# Patient Record
Sex: FEMALE | Race: BLACK OR AFRICAN AMERICAN | NOT HISPANIC OR LATINO | Employment: FULL TIME | URBAN - METROPOLITAN AREA
[De-identification: names, ages, dates, MRNs, and addresses within clinical notes are randomized per-mention and may not be internally consistent; named-entity substitution may affect disease eponyms.]

---

## 2017-04-10 ENCOUNTER — OFFICE VISIT (OUTPATIENT)
Dept: OBSTETRICS AND GYNECOLOGY | Facility: CLINIC | Age: 47
End: 2017-04-10
Attending: OBSTETRICS & GYNECOLOGY
Payer: COMMERCIAL

## 2017-04-10 VITALS
BODY MASS INDEX: 39.25 KG/M2 | HEIGHT: 69 IN | SYSTOLIC BLOOD PRESSURE: 150 MMHG | DIASTOLIC BLOOD PRESSURE: 84 MMHG | WEIGHT: 265 LBS

## 2017-04-10 DIAGNOSIS — N93.9 ABNORMAL UTERINE BLEEDING: ICD-10-CM

## 2017-04-10 DIAGNOSIS — Z01.419 VISIT FOR GYNECOLOGIC EXAMINATION: Primary | ICD-10-CM

## 2017-04-10 DIAGNOSIS — Z12.31 ENCOUNTER FOR SCREENING MAMMOGRAM FOR BREAST CANCER: ICD-10-CM

## 2017-04-10 LAB
B-HCG UR QL: NEGATIVE
CTP QC/QA: YES

## 2017-04-10 PROCEDURE — 99386 PREV VISIT NEW AGE 40-64: CPT | Mod: 25,S$GLB,, | Performed by: OBSTETRICS & GYNECOLOGY

## 2017-04-10 PROCEDURE — 88305 TISSUE EXAM BY PATHOLOGIST: CPT | Mod: 26,,, | Performed by: PATHOLOGY

## 2017-04-10 PROCEDURE — 81025 URINE PREGNANCY TEST: CPT | Mod: QW,S$GLB,, | Performed by: OBSTETRICS & GYNECOLOGY

## 2017-04-10 PROCEDURE — 88305 TISSUE EXAM BY PATHOLOGIST: CPT | Performed by: PATHOLOGY

## 2017-04-10 PROCEDURE — 88175 CYTOPATH C/V AUTO FLUID REDO: CPT

## 2017-04-10 PROCEDURE — 99999 PR PBB SHADOW E&M-NEW PATIENT-LVL III: CPT | Mod: PBBFAC,,, | Performed by: OBSTETRICS & GYNECOLOGY

## 2017-04-10 PROCEDURE — 58100 BIOPSY OF UTERUS LINING: CPT | Mod: S$GLB,,, | Performed by: OBSTETRICS & GYNECOLOGY

## 2017-04-10 RX ORDER — GLATIRAMER ACETATE 40 MG/ML
1 INJECTION, SOLUTION SUBCUTANEOUS
COMMUNITY
End: 2024-02-27

## 2017-04-10 RX ORDER — FERROUS SULFATE 325(65) MG
325 TABLET ORAL
COMMUNITY
End: 2019-07-25

## 2017-04-10 RX ORDER — ROPINIROLE HYDROCHLORIDE 2 MG/1
2 TABLET, FILM COATED, EXTENDED RELEASE ORAL NIGHTLY
Refills: 0 | COMMUNITY
Start: 2017-03-03 | End: 2019-07-25

## 2017-04-10 RX ORDER — LOSARTAN POTASSIUM 25 MG/1
25 TABLET ORAL DAILY
COMMUNITY
End: 2017-06-30 | Stop reason: CLARIF

## 2017-04-10 RX ORDER — GABAPENTIN 300 MG/1
CAPSULE ORAL
Refills: 3 | COMMUNITY
Start: 2017-03-06 | End: 2020-10-20

## 2017-04-10 NOTE — MR AVS SNAPSHOT
[facility] [address]  [address]  [facility] [address]  Phone: [phone]  Fax: [phone]                  Eleazar Dickson   4/10/2017 1:00 PM   Office Visit    Description:  Female : 1970   Provider:  Amber Cowart MD   Department:  [facility] [address]           Reason for Visit     Well Woman     Menorrhagia           Diagnoses this Visit        Comments    Visit for gynecologic examination    -  Primary     Abnormal uterine bleeding         Encounter for screening mammogram for breast cancer                To Do List           Goals (5 Years of Data)     None      Follow-Up and Disposition     Return in about 1 year (around 4/10/2018).      [facility] On Call     [facility] On Call Nurse Care Line -  Assistance  Unless otherwise directed by your provider, please contact [facility] On-Call, our nurse care line that is available for  assistance.     Registered nurses in the [facility] On Call Center provide: appointment scheduling, clinical advisement, health education, and other advisory services.  Call: [phone] (toll free)               Medications           Message regarding Medications     Verify the changes and/or additions to your medication regime listed below are the same as discussed with your clinician today.  If any of these changes or additions are incorrect, please notify your healthcare provider.             Verify that the below list of medications is an accurate representation of the medications you are currently taking.  If none reported, the list may be blank. If incorrect, please contact your healthcare provider. Carry this list with you in case of emergency.           Current Medications     ferrous sulfate (IRON, FERROUS SULFATE,) 325 mg (65 mg iron) Tab tablet Take 325 mg by mouth daily with breakfast.    gabapentin (NEURONTIN) 300 MG capsule TAKE 1 CAPSULE 3 TIMES DAILY.    GLATIRAMER ACETATE (COPAXONE 40MG/ML) Inject 1 Syringe into  "the skin 3 (three) times a week.    losartan (COZAAR) 25 MG tablet Take 25 mg by mouth once daily.    ropinirole (REQUIP XL) 2 mg 24 hr tablet Take 2 mg by mouth every evening.           Clinical Reference Information           Your Vitals Were     BP Height Weight Last Period BMI    150/84 5' 9" (1.753 m) 120.2 kg (264 lb 15.9 oz) 03/30/2017 39.13 kg/m2      Blood Pressure          Most Recent Value    BP  (!)  150/84      Allergies as of 4/10/2017     No Known Allergies      Immunizations Administered on Date of Encounter - 4/10/2017     None      Orders Placed During Today's Visit      Normal Orders This Visit    Liquid-based pap smear, screening     Liquid-based pap smear, screening     POCT urine pregnancy     Tissue Specimen To Pathology, Obstetrics/Gynecology     Future Labs/Procedures Expected by Expires    Mammo Digital Screening Bilat with CAD  4/10/2017 6/10/2018    Mammo Digital Screening Bilat with CAD  4/10/2017 6/10/2018    US Pelvis Comp with Transvag NON-OB (xpd  4/10/2017 4/11/2018    Endometrial biopsy  As directed 4/10/2018         4/10/2017  2:07 PM - [facility]      Component Results     Component Value Flag Ref Range Units Status    POC Preg Test, Ur Negative  Negative  Final     Acceptable Yes    Final            [facility] Sign-Up     Activating your [facility] account is as easy as 1-2-3!     1) Visit [email or web address], select Sign Up Now, enter this activation code and your date of birth, then select Next.  [email or web address]  Expires: 5/25/2017  1:38 PM      2) Create a username and password to use when you visit [facility] in the future and select a security question in case you lose your password and select Next.    3) Enter your e-mail address and click Sign Up!    Additional Information  If you have questions, please e-mail [email or web address] or call [phone] to talk to our [facility] staff. Remember, [facility] is NOT to be used for urgent needs. For medical " emergencies, dial 911.         Instructions      Understanding Laparoscopic Hysterectomy  Youre having your uterus (womb) removed. This is called a hysterectomy. Your doctor is suggesting a technique called laparoscopy. This technique has many benefits. You may spend less time in the hospital. You may also recover faster.  What is hysterectomy?  Hysterectomy removes the uterus. Part or all of the uterus may be taken out. Certain other organs may be removed at the same time. Having your uterus removed means that you wont be able to become pregnant in the future.  What is laparoscopy?  Laparoscopy is a type of surgery. A long, lighted tube with a camera is used. This is called a laparoscope. The scope sends pictures of the inside of the body to a video screen. For the surgery, a few small incisions are made in the abdomen. The scope is inserted through one of the small incisions. Surgical tools are inserted through the other incisions.  Benefits of laparoscopy  This procedure lets you avoid open surgery. Open surgery requires a larger incision in the abdomen. Compared to open surgery laparoscopy may:  · Require less time in the hospital or surgery center  · Offer a faster recovery  · Cause less internal scarring and smaller visible scars  · Cause less pain after surgery  · Have a lower risk of complications  Risks and possible complications of laparoscopic hysterectomy  · Side effects from anesthesia  · Infection  · Bleeding, with a possible need for a transfusion  · Blood clots  · Damage to the bladder, bowel, ureters, or nearby nerves  · Hernia  · Formation of scar tissue that can cause pain or bowel obstruction often times years later  · Need for a second surgery  Date Last Reviewed: 5/11/2015  © 8527-1896 [employer]. [address] [place] [address], [place] [address]. All rights reserved. This information is not intended as a substitute for professional medical care. Always follow your healthcare  professional's instructions.        Endometrial Ablation  Endometrial ablation is an outpatient surgery that can reduce or stop heavy menstrual bleeding. Ablation destroys the lining of the uterus. This surgery is for women who do not want to have any more children and who have not yet entered menopause. It should not be used by women with endometrial hyperplasia or cancer of the uterus.  Treatment takes less than an hour, and you can go home later that day.  Preparing for surgery  · You may be given medicine by mouth or injection for a few weeks or months before your ablation. This thins the lining of the uterus and reduces bleeding.  · Your health care provider may recommend other procedures to check the inside of your uterus before the ablation is done.   · The day before surgery, you may be given medicine or a special substance (laminaria) may be put into your cervix (the opening to the uterus). This widens the opening.  · To help prevent problems with anesthesia, do not eat or drink anything 10 hours before surgery.  Your surgery     Destroying the lining with heat, freezing, or electric current prevents the lining from growing back.      · Youll be given anesthesia so you stay comfortable and relaxed and feel no pain during surgery.  · Then, your uterus may be filled with fluid. This puts pressure on the lining to help reduce bleeding. It also allows your health care provider to see inside your uterus.  · Next your health care provider puts a small telescope-like instrument through the cervix. This scope may be connected to a video monitor. This helps your health care provider see and control the ablation process. At the end of the scope, a device using heat, freezing, or electric current destroys the uterine lining. Instead of the scope, your health care provider may use a device that both expands and destroys the uterine lining. After being inserted into your uterus, it also uses heat or other energy to  destroy the lining. Your health care provider will choose the device thats best for you.  Your recovery  · You may have cramping or aching in your abdomen after surgery. Your health care provider can give you pain medicine.  · You may also have a bloody or watery discharge or bleeding for days or weeks. Use sanitary pads, not tampons.  · Dont have sexual intercourse or play active sports for 2 weeks after surgery.  · You can likely return to work in 2 days.  · Ask your health care provider about using contraception after an ablation.  · Your health care provider will see you in about 6 weeks to be sure youre healing well.  Call your health care provider if you have any of the following after surgery:  · Persistent or increased abdominal pain  · Shortness of breath  · Heavy vaginal bleeding  · Fever over 100.4°F (38°C) or chills  · Nausea  · Frequent urination for 24 hours   Date Last Reviewed: 5/10/2015  © 0357-9884 [employer]. [address] [place] [address]. All rights reserved. This information is not intended as a substitute for professional medical care. Always follow your healthcare professional's instructions.             Language Assistance Services     ATTENTION: Language assistance services are available, free of charge. Please call [phone].      ATENCIÓN: Si habla jaymie, tiene a graves disposición servicios gratuitos de asistencia lingüística. Llame al [phone].     [facility] Ý: N?u b?n nói Ti?ng Vi?t, có các d?ch v? h? tr? ngôn ng? mi?n phí dành cho b?n. G?i s? [phone].         [facility] [address] complies with applicable Federal civil rights laws and does not discriminate on the basis of race, color, national origin, age, disability, or sex.

## 2017-04-10 NOTE — PATIENT INSTRUCTIONS
Understanding Laparoscopic Hysterectomy  Youre having your uterus (womb) removed. This is called a hysterectomy. Your doctor is suggesting a technique called laparoscopy. This technique has many benefits. You may spend less time in the hospital. You may also recover faster.  What is hysterectomy?  Hysterectomy removes the uterus. Part or all of the uterus may be taken out. Certain other organs may be removed at the same time. Having your uterus removed means that you wont be able to become pregnant in the future.  What is laparoscopy?  Laparoscopy is a type of surgery. A long, lighted tube with a camera is used. This is called a laparoscope. The scope sends pictures of the inside of the body to a video screen. For the surgery, a few small incisions are made in the abdomen. The scope is inserted through one of the small incisions. Surgical tools are inserted through the other incisions.  Benefits of laparoscopy  This procedure lets you avoid open surgery. Open surgery requires a larger incision in the abdomen. Compared to open surgery laparoscopy may:  · Require less time in the hospital or surgery center  · Offer a faster recovery  · Cause less internal scarring and smaller visible scars  · Cause less pain after surgery  · Have a lower risk of complications  Risks and possible complications of laparoscopic hysterectomy  · Side effects from anesthesia  · Infection  · Bleeding, with a possible need for a transfusion  · Blood clots  · Damage to the bladder, bowel, ureters, or nearby nerves  · Hernia  · Formation of scar tissue that can cause pain or bowel obstruction often times years later  · Need for a second surgery  Date Last Reviewed: 5/11/2015  © 0745-6013 [employer]. [address] [place] [address], [place] [address]. All rights reserved. This information is not intended as a substitute for professional medical care. Always follow your healthcare professional's instructions.        Endometrial  Ablation  Endometrial ablation is an outpatient surgery that can reduce or stop heavy menstrual bleeding. Ablation destroys the lining of the uterus. This surgery is for women who do not want to have any more children and who have not yet entered menopause. It should not be used by women with endometrial hyperplasia or cancer of the uterus.  Treatment takes less than an hour, and you can go home later that day.  Preparing for surgery  · You may be given medicine by mouth or injection for a few weeks or months before your ablation. This thins the lining of the uterus and reduces bleeding.  · Your health care provider may recommend other procedures to check the inside of your uterus before the ablation is done.   · The day before surgery, you may be given medicine or a special substance (laminaria) may be put into your cervix (the opening to the uterus). This widens the opening.  · To help prevent problems with anesthesia, do not eat or drink anything 10 hours before surgery.  Your surgery     Destroying the lining with heat, freezing, or electric current prevents the lining from growing back.      · Youll be given anesthesia so you stay comfortable and relaxed and feel no pain during surgery.  · Then, your uterus may be filled with fluid. This puts pressure on the lining to help reduce bleeding. It also allows your health care provider to see inside your uterus.  · Next your health care provider puts a small telescope-like instrument through the cervix. This scope may be connected to a video monitor. This helps your health care provider see and control the ablation process. At the end of the scope, a device using heat, freezing, or electric current destroys the uterine lining. Instead of the scope, your health care provider may use a device that both expands and destroys the uterine lining. After being inserted into your uterus, it also uses heat or other energy to destroy the lining. Your health care provider will  choose the device thats best for you.  Your recovery  · You may have cramping or aching in your abdomen after surgery. Your health care provider can give you pain medicine.  · You may also have a bloody or watery discharge or bleeding for days or weeks. Use sanitary pads, not tampons.  · Dont have sexual intercourse or play active sports for 2 weeks after surgery.  · You can likely return to work in 2 days.  · Ask your health care provider about using contraception after an ablation.  · Your health care provider will see you in about 6 weeks to be sure youre healing well.  Call your health care provider if you have any of the following after surgery:  · Persistent or increased abdominal pain  · Shortness of breath  · Heavy vaginal bleeding  · Fever over 100.4°F (38°C) or chills  · Nausea  · Frequent urination for 24 hours   Date Last Reviewed: 5/10/2015  © 3052-2843 [employer]. [address] [place] [address], [place] [address]. All rights reserved. This information is not intended as a substitute for professional medical care. Always follow your healthcare professional's instructions.

## 2017-04-10 NOTE — PROGRESS NOTES
"CC: Well woman exam    Eleazar Dickson is a 46 y.o. female  presents for a well woman exam.      She reports heavy menses.  She reports passing clots.  She reports having to wear a tampon and an overnight pad.  She is interested in an ablation or hysterectomy.  Last ultrasound from  was reviewed and showed very small fibroids.      Past Medical History:   Diagnosis Date    Anemia     Hypertension     Restless leg syndrome        Past Surgical History:   Procedure Laterality Date     SECTION         OB History    Para Term  AB SAB TAB Ectopic Multiple Living   3 2        2      # Outcome Date GA Lbr Conner/2nd Weight Sex Delivery Anes PTL Lv   3             2 Para      CS-LTranv   Y   1 Para      CS-LTranv   Y          Family History   Problem Relation Age of Onset    Cancer Maternal Grandmother     Colon cancer Maternal Grandmother     Breast cancer Neg Hx     Ovarian cancer Neg Hx        Social History   Substance Use Topics    Smoking status: Former Smoker    Smokeless tobacco: None    Alcohol use Yes       BP (!) 150/84  Ht 5' 9" (1.753 m)  Wt 120.2 kg (264 lb 15.9 oz)  LMP 2017  BMI 39.13 kg/m2    ROS:  GENERAL: Denies weight gain or weight loss. Feeling well overall.   SKIN: Denies rash or lesions.   HEAD: Denies head injury or headache.   NODES: Denies enlarged lymph nodes.   CHEST: Denies chest pain or shortness of breath.   CARDIOVASCULAR: Denies palpitations or left sided chest pain.   ABDOMEN: No abdominal pain, constipation, diarrhea, nausea, vomiting or rectal bleeding.   URINARY: No frequency, dysuria, hematuria, or burning on urination.  REPRODUCTIVE: See HPI.   BREASTS: The patient performs breast self-examination and denies pain, lumps, or nipple discharge.   HEMATOLOGIC: No easy bruisability or excessive bleeding, other than menses.  MUSCULOSKELETAL: Denies joint pain or swelling.   NEUROLOGIC: Denies syncope or weakness.   PSYCHIATRIC: Denies " depression, anxiety or mood swings.    Physical Exam:    APPEARANCE: Well nourished, well developed, in no acute distress.  AFFECT: WNL, alert and oriented x 3  SKIN: No acne or hirsutism  NECK: Neck symmetric without masses or thyromegaly  NODES: No inguinal, cervical, axillary, or femoral lymph node enlargement  CHEST: Good respiratory effect  ABDOMEN: Soft.  No tenderness or masses.  No hepatosplenomegaly.  No hernias.  BREASTS: Symmetrical, no skin changes or visible lesions.  No palpable masses, nipple discharge bilaterally.  PELVIC: Normal external genitalia without lesions.  Normal hair distribution.  Adequate perineal body, normal urethral meatus.  Vagina moist and well rugated without lesions or discharge.  Cervix pink, without lesions, discharge or tenderness.  No significant cystocele or rectocele.  Bimanual exam shows uterus to be normal size, regular, mobile and nontender.  Adnexa without masses or tenderness.    EXTREMITIES: No edema.    CC: ENDOMETRIAL BIOPSPY    Eleazar Dickson is a 46 y.o. female  presents for an endometrial biopsy secondary to abnormal uterine bleeding.  UPT is Negative.      PRE-ENDOMETRIAL BIOPSY COUNSELING:    The patient was informed of the risk of bleeding, infection, uterine perforation and pain and that the test will rule-out endometrial cancer with accuracy greater than 95%.  She was counseled on the alternatives to endometrial biopsy and agrees to proceed.      TIME OUT PERFORMED.    The cervix was visualized with a speculum.  A single tooth tenaculum was placed on the anterior lip prior to the biopsy.      A sterile endometrial pipelle was passed without difficulty to a depth of 10 cm.    Endometrial tissue was obtained.      The specimen was placed in formalyn and sent to Pathology of histology evaluation.    The patient tolerated the procedure well.      ASSESSMENT AND PLAN  The primary encounter diagnosis was Visit for gynecologic examination. Diagnoses of Abnormal  uterine bleeding and Encounter for screening mammogram for breast cancer were also pertinent to this visit.    POST ENDOMETRIAL BIOPSY COUNSELING:  Manage post biopsy cramping with NSAIDs or Tylenol.  Expect spotting or light bleeding for a few days.  Report bleeding heavier than a period, fever > 101.0 F, worsening pain or a foul smelling vaginal discharge.      Counseling lasted approximately 15 minutes and all her questions were answered.      FOLLOW-UP:  Pending biopsy.      ASSESSMENT AND PLAN  1. Visit for gynecologic examination  Liquid-based pap smear, screening    Mammo Digital Screening Bilat with CAD    Liquid-based pap smear, screening   2. Abnormal uterine bleeding  POCT urine pregnancy    Tissue Specimen To Pathology, Obstetrics/Gynecology    Endometrial biopsy    US Pelvis Comp with Transvag NON-OB (xpd   3. Encounter for screening mammogram for breast cancer  Mammo Digital Screening Bilat with CAD       Patient was counseled today on A.C.S. Pap guidelines and recommendations for yearly pelvic exams, pap smears every 3 years, mammograms and monthly self breast exams; to see her PCP for other health maintenance.     Discussed possible causes of AUB and treatment options.  Patient is interested in ablation vs. Hysterectomy.  Information give so she can decide.      Return in about 1 year (around 4/10/2018).

## 2017-04-11 ENCOUNTER — HOSPITAL ENCOUNTER (OUTPATIENT)
Dept: RADIOLOGY | Facility: HOSPITAL | Age: 47
Discharge: HOME | End: 2017-04-11
Attending: OBSTETRICS & GYNECOLOGY
Payer: COMMERCIAL

## 2017-04-11 DIAGNOSIS — N93.9 ABNORMAL UTERINE BLEEDING: ICD-10-CM

## 2017-04-11 PROCEDURE — 76830 TRANSVAGINAL US NON-OB: CPT | Mod: 26,,, | Performed by: RADIOLOGY

## 2017-04-11 PROCEDURE — 76856 US EXAM PELVIC COMPLETE: CPT | Mod: 26,,, | Performed by: RADIOLOGY

## 2017-04-11 PROCEDURE — 76856 US EXAM PELVIC COMPLETE: CPT | Mod: TC

## 2017-04-18 ENCOUNTER — PATIENT MESSAGE (OUTPATIENT)
Dept: OBSTETRICS AND GYNECOLOGY | Facility: CLINIC | Age: 47
End: 2017-04-18

## 2017-04-21 ENCOUNTER — TELEPHONE (OUTPATIENT)
Dept: OBSTETRICS AND GYNECOLOGY | Facility: CLINIC | Age: 47
End: 2017-04-21

## 2017-04-21 NOTE — TELEPHONE ENCOUNTER
Sx date request.      Pt would like to proceed with a hysterectomy.  Pt is requesting a Sx date for the last week of June. Do you have 6/27 available?

## 2017-04-23 DIAGNOSIS — N92.1 MENORRHAGIA WITH IRREGULAR CYCLE: Primary | ICD-10-CM

## 2017-04-23 DIAGNOSIS — N92.0 MENORRHAGIA: ICD-10-CM

## 2017-04-23 RX ORDER — DEXTROSE MONOHYDRATE AND SODIUM CHLORIDE 5; .9 G/100ML; G/100ML
INJECTION, SOLUTION INTRAVENOUS CONTINUOUS
Status: CANCELLED | OUTPATIENT
Start: 2017-04-23

## 2017-04-24 ENCOUNTER — TELEPHONE (OUTPATIENT)
Dept: OBSTETRICS AND GYNECOLOGY | Facility: CLINIC | Age: 47
End: 2017-04-24

## 2017-04-24 NOTE — TELEPHONE ENCOUNTER
Called Eliz in Sx scheduling regarding changing the pt Sx date to 7/11 per pt request.  Eliz verbalized understanding.  Called pt to schedule pre-op and pre-admit appts. Informed pt of appt date and time.  Pt verbalized understanding.  Letter sent out.

## 2017-06-12 ENCOUNTER — TELEPHONE (OUTPATIENT)
Dept: OBSTETRICS AND GYNECOLOGY | Facility: CLINIC | Age: 47
End: 2017-06-12

## 2017-06-12 NOTE — TELEPHONE ENCOUNTER
Returned pt call regarding rescheduling her appt to a later time due to Dr Cowart not being available at the provided time.  Left message with pt's  to see if she can come in for 3 pm on 6/30.  Pt's  verbalized understanding and stated that he will give the pt the message. Spoke to pt regarding the above info.  Pt verbalized understanding.  Letter sent out.

## 2017-06-12 NOTE — TELEPHONE ENCOUNTER
Called pt to reschedule her pre-op appt on 6/30 to a later time due to Dr Cowart not being available at the provided time.  Pt unavailable.  Left message with pt's  to have her return a call to the clinic.  Pt's  verbalized understanding.

## 2017-06-12 NOTE — TELEPHONE ENCOUNTER
----- Message from Janey Thompson sent at 6/12/2017  2:16 PM CDT -----  Contact: self  Pt returning a missed call, she can be reached at [phone].

## 2017-06-30 ENCOUNTER — ANESTHESIA EVENT (OUTPATIENT)
Dept: SURGERY | Facility: OTHER | Age: 47
End: 2017-06-30
Payer: COMMERCIAL

## 2017-06-30 ENCOUNTER — TELEPHONE (OUTPATIENT)
Dept: OBSTETRICS AND GYNECOLOGY | Facility: CLINIC | Age: 47
End: 2017-06-30

## 2017-06-30 ENCOUNTER — HOSPITAL ENCOUNTER (OUTPATIENT)
Dept: PREADMISSION TESTING | Facility: OTHER | Age: 47
Discharge: HOME | End: 2017-06-30
Attending: OBSTETRICS & GYNECOLOGY
Payer: COMMERCIAL

## 2017-06-30 VITALS
DIASTOLIC BLOOD PRESSURE: 82 MMHG | HEIGHT: 69 IN | SYSTOLIC BLOOD PRESSURE: 144 MMHG | TEMPERATURE: 99 F | RESPIRATION RATE: 16 BRPM | HEART RATE: 68 BPM | OXYGEN SATURATION: 98 % | BODY MASS INDEX: 38.21 KG/M2 | WEIGHT: 258 LBS

## 2017-06-30 LAB
ABO + RH BLD: NORMAL
ANION GAP SERPL CALC-SCNC: 6 MMOL/L
BLD GP AB SCN CELLS X3 SERPL QL: NORMAL
BUN SERPL-MCNC: 9 MG/DL
CALCIUM SERPL-MCNC: 10.6 MG/DL
CHLORIDE SERPL-SCNC: 107 MMOL/L
CO2 SERPL-SCNC: 25 MMOL/L
CREAT SERPL-MCNC: 0.8 MG/DL
EST. GFR  (AFRICAN AMERICAN): >60 ML/MIN/1.73 M^2
EST. GFR  (NON AFRICAN AMERICAN): >60 ML/MIN/1.73 M^2
GLUCOSE SERPL-MCNC: 83 MG/DL
HCT VFR BLD AUTO: 35.4 %
HGB BLD-MCNC: 11.6 G/DL
POTASSIUM SERPL-SCNC: 4 MMOL/L
SODIUM SERPL-SCNC: 138 MMOL/L

## 2017-06-30 PROCEDURE — 85014 HEMATOCRIT: CPT

## 2017-06-30 PROCEDURE — 86900 BLOOD TYPING SEROLOGIC ABO: CPT

## 2017-06-30 PROCEDURE — 85018 HEMOGLOBIN: CPT

## 2017-06-30 PROCEDURE — 86850 RBC ANTIBODY SCREEN: CPT

## 2017-06-30 PROCEDURE — 80048 BASIC METABOLIC PNL TOTAL CA: CPT

## 2017-06-30 PROCEDURE — 36415 COLL VENOUS BLD VENIPUNCTURE: CPT

## 2017-06-30 RX ORDER — MIDAZOLAM HYDROCHLORIDE 5 MG/ML
4 INJECTION INTRAMUSCULAR; INTRAVENOUS ONCE AS NEEDED
Status: CANCELLED | OUTPATIENT
Start: 2017-06-30 | End: 2017-06-30

## 2017-06-30 RX ORDER — BACLOFEN 10 MG/1
10 TABLET ORAL DAILY
COMMUNITY

## 2017-06-30 RX ORDER — SODIUM CHLORIDE, SODIUM LACTATE, POTASSIUM CHLORIDE, CALCIUM CHLORIDE 600; 310; 30; 20 MG/100ML; MG/100ML; MG/100ML; MG/100ML
INJECTION, SOLUTION INTRAVENOUS CONTINUOUS
Status: CANCELLED | OUTPATIENT
Start: 2017-06-30

## 2017-06-30 RX ORDER — LOSARTAN POTASSIUM AND HYDROCHLOROTHIAZIDE 12.5; 5 MG/1; MG/1
1 TABLET ORAL DAILY
COMMUNITY
End: 2020-10-20 | Stop reason: ALTCHOICE

## 2017-06-30 RX ORDER — LIDOCAINE HYDROCHLORIDE 10 MG/ML
1 INJECTION, SOLUTION EPIDURAL; INFILTRATION; INTRACAUDAL; PERINEURAL ONCE
Status: CANCELLED | OUTPATIENT
Start: 2017-06-30 | End: 2017-06-30

## 2017-06-30 RX ORDER — CHOLECALCIFEROL (VITAMIN D3) 25 MCG
1000 TABLET ORAL DAILY
COMMUNITY
End: 2024-02-27

## 2017-06-30 RX ORDER — FAMOTIDINE 20 MG/1
20 TABLET, FILM COATED ORAL
Status: CANCELLED | OUTPATIENT
Start: 2017-06-30 | End: 2017-06-30

## 2017-06-30 NOTE — ANESTHESIA PREPROCEDURE EVALUATION
06/30/2017  Eleazar Solorzano is a 46 y.o., female.    Anesthesia Evaluation    I have reviewed the Patient Summary Reports.    I have reviewed the Nursing Notes.   I have reviewed the Medications.     Review of Systems  Anesthesia Hx:  Denies Family Hx of Anesthesia complications.   Denies Personal Hx of Anesthesia complications.   Social:  Non-Smoker    Hematology/Oncology:     Oncology Normal    -- Anemia:   EENT/Dental:EENT/Dental Normal   Cardiovascular:   Exercise tolerance: good Hypertension    Pulmonary:  Pulmonary Normal    Renal/:  Renal/ Normal     Hepatic/GI:  Hepatic/GI Normal    Musculoskeletal:   On neurontin, baclofen Spine Disorders: lumbar and cervical Disc disease    Neurological:  Neurology Normal Restless leg syndrome    MS, Right sided numbness/tingling. Vision irregularities    On copaxone injections   Endocrine:  Endocrine Normal    Dermatological:  Skin Normal    Psych:  Psychiatric Normal           Physical Exam  General:  Obesity    Airway/Jaw/Neck:  Airway Findings: Mouth Opening: Normal Mallampati: II  TM Distance: Normal, at least 6 cm      Dental:  Dental Findings: In tact, upper front caps        Mental Status:  Mental Status Findings:  Cooperative, Alert and Oriented         Anesthesia Plan  Type of Anesthesia, risks & benefits discussed:  Anesthesia Type:  general  Patient's Preference:   Intra-op Monitoring Plan: standard ASA monitors  Intra-op Monitoring Plan Comments:   Post Op Pain Control Plan: multimodal analgesia  Post Op Pain Control Plan Comments:   Induction:   IV  Beta Blocker:         Informed Consent: Patient understands risks and agrees with Anesthesia plan.  Questions answered. Anesthesia consent signed with patient.  ASA Score: 3     Day of Surgery Review of History & Physical:    H&P update referred to the surgeon.     Anesthesia Plan Notes:  Labs ordered today with T&S        Ready For Surgery From Anesthesia Perspective.

## 2017-06-30 NOTE — TELEPHONE ENCOUNTER
----- Message from Scarlett Sibley sent at 6/30/2017  1:16 PM CDT -----  Contact: Pt  x_  1st Request  _  2nd Request  _  3rd Request    Who:TARIK HUYNH [[id number]]    Why: pt returning call..    What Number to Call Back: [phone]    When to Expect a call back: (Before the end of the day)   -- if call after 3:00 call back will be tomorrow.

## 2017-06-30 NOTE — TELEPHONE ENCOUNTER
Returned pt call regarding rescheduling her appt due to Dr Cowart not being available today.  Informed pt of appt date and time.  Pt questioned when her insurance company will be contacted to authorize her SX.  Informed pt that the billing dept handles authorizations and that she can contact them.  Pt verbalized understanding.  Letter sent out.

## 2017-06-30 NOTE — TELEPHONE ENCOUNTER
Called pt to reschedule her appt due to Dr Cowart not being available on the provided date.  No answer.  Left VM message to return call to clinic.

## 2017-06-30 NOTE — DISCHARGE INSTRUCTIONS
PRE-ADMIT TESTING -  [phone]    [address]  [facility]        OUTPATIENT SURGERY UNIT - [phone]    Your surgery has been scheduled at [facility]. We are pleased to have the opportunity to serve you. For Further Information please call [phone].    On the day of surgery please report to the Information Desk on the 1st floor.    · CONTACT YOUR PHYSICIAN'S OFFICE THE DAY PRIOR TO YOUR SURGERY TO OBTAIN YOUR ARRIVAL TIME.     · The evening before surgery do not eat anything after 9 p.m. ( this includes hard candy, chewing gum and mints).  You may only have GATORADE, POWERADE AND WATER  from 9 p.m. until you leave your home.   DO NOT DRINK ANY LIQUIDS ON THE WAY TO THE HOSPITAL.      SPECIAL MEDICATION INSTRUCTIONS: TAKE medications checked off by the Anesthesiologist on your Medication List.    Angiogram Patients: Take medications as instructed by your physician, including aspirin.     Surgery Patients:    If you take ASPIRIN - Your PHYSICIAN/SURGEON will need to inform you IF/OR when you need to stop taking aspirin prior to your surgery.     Do Not take any medications containing IBUPROFEN.  Do Not Wear any make-up or dark nail polish   (especially eye make-up) to surgery. If you come to surgery with makeup on you will be required to remove the makeup or nail polish.    Do not shave your surgical area at least 5 days prior to your surgery. The surgical prep will be performed at the hospital according to Infection Control regulations.    Leave all valuables at home.   Do Not wear any jewelry or watches, including any metal in body piercings.  Contact Lens must be removed before surgery. Either do not wear the contact lens or bring a case and solution for storage.  Please bring a container for eyeglasses or dentures as required.  Bring any paperwork your physician has provided, such as consent forms,  history and physicals, doctor's orders, etc.   Bring comfortable clothes  that are loose fitting to wear upon discharge. Take into consideration the type of surgery being performed.  Maintain your diet as advised per your physician the day prior to surgery.      Adequate rest the night before surgery is advised.   Park in the Parking lot behind the hospital or in the [place] [facility] across the street from the parking lot. Parking is complimentary.  If you will be discharged the same day as your procedure, please arrange for a responsible adult to drive you home or to accompany you if traveling by taxi.   YOU WILL NOT BE PERMITTED TO DRIVE OR TO LEAVE THE HOSPITAL ALONE AFTER SURGERY.   It is strongly recommended that you arrange for someone to remain with you for the first 24 hrs following your surgery.       Thank you for your cooperation.  The Staff of [facility].        Bathing Instructions                                                                 Please shower the evening before and morning of your procedure with    ANTIBACTERIAL SOAP. ( DIAL, etc )  Concentrate on the surgical area   for at least 3 minutes and rinse completely. Dry off as usual.   Do not use any deodorant, powder, body lotions, perfume, after shave or    cologne.

## 2017-07-05 ENCOUNTER — OFFICE VISIT (OUTPATIENT)
Dept: OBSTETRICS AND GYNECOLOGY | Facility: CLINIC | Age: 47
End: 2017-07-05
Attending: OBSTETRICS & GYNECOLOGY
Payer: COMMERCIAL

## 2017-07-05 VITALS
DIASTOLIC BLOOD PRESSURE: 80 MMHG | SYSTOLIC BLOOD PRESSURE: 136 MMHG | BODY MASS INDEX: 39.15 KG/M2 | WEIGHT: 264.31 LBS | HEIGHT: 69 IN

## 2017-07-05 DIAGNOSIS — Z01.818 PREOPERATIVE EXAM FOR GYNECOLOGIC SURGERY: Primary | ICD-10-CM

## 2017-07-05 PROCEDURE — 99499 UNLISTED E&M SERVICE: CPT | Mod: S$GLB,,, | Performed by: OBSTETRICS & GYNECOLOGY

## 2017-07-05 PROCEDURE — 99999 PR PBB SHADOW E&M-EST. PATIENT-LVL III: CPT | Mod: PBBFAC,,, | Performed by: OBSTETRICS & GYNECOLOGY

## 2017-07-05 NOTE — PROGRESS NOTES
"CC: Preop exam    Eleazar Solorzano is a 46 y.o. female  presents for a pre-op exam for a TLH, bilateral salpingectomy scheduled on 2017.      Past Medical History:   Diagnosis Date    Anemia     Hypertension     Lumbar pain     Restless leg syndrome        Past Surgical History:   Procedure Laterality Date     SECTION      dilatation & curretage         OB History    Para Term  AB Living   3 2       2   SAB TAB Ectopic Multiple Live Births           2      # Outcome Date GA Lbr Conner/2nd Weight Sex Delivery Anes PTL Lv   3             2 Para      CS-LTranv   CARLI   1 Para      CS-LTranv   CARLI          Family History   Problem Relation Age of Onset    Cancer Maternal Grandmother     Colon cancer Maternal Grandmother     Breast cancer Neg Hx     Ovarian cancer Neg Hx        Social History   Substance Use Topics    Smoking status: Former Smoker    Smokeless tobacco: Never Used    Alcohol use Yes      Comment: rare       /80 (BP Location: Left arm, Patient Position: Sitting, BP Method: Manual)   Ht 5' 9" (1.753 m)   Wt 119.9 kg (264 lb 5.3 oz)   LMP 2017 (Exact Date)   BMI 39.03 kg/m²     ROS:  GENERAL: Denies weight gain or weight loss. Feeling well overall.   SKIN: Denies rash or lesions.   HEAD: Denies head injury or headache.   NODES: Denies enlarged lymph nodes.   CHEST: Denies chest pain or shortness of breath.   CARDIOVASCULAR: Denies palpitations or left sided chest pain.   ABDOMEN: No abdominal pain, constipation, diarrhea, nausea, vomiting or rectal bleeding.   URINARY: No frequency, dysuria, hematuria, or burning on urination.  REPRODUCTIVE: See HPI.   HEMATOLOGIC: No easy bruisability or excessive bleeding with the exception of menstrual cycles.  MUSCULOSKELETAL: Denies joint pain or swelling.   NEUROLOGIC: Denies syncope or weakness.   PSYCHIATRIC: Denies depression, anxiety or mood swings.    PHYSICAL EXAM:    APPEARANCE: Well " nourished, well developed, in no acute distress.  AFFECT: WNL, alert and oriented x 3  SKIN: No acne or hirsutism  NECK: Neck symmetric without masses or thyromegaly  NODES: No inguinal, cervical, axillary, or femoral lymph node enlargement  CHEST: Good respiratory effect  ABDOMEN: Soft.  No tenderness or masses.  No hepatosplenomegaly.  No hernias.  PELVIC: Deferred  EXTREMITIES: No edema.      ICD-10-CM ICD-9-CM    1. Preoperative exam for gynecologic surgery Z01.818 V72.83            I have discussed the risks, benefits, indications, and alternatives of the procedure in detail.  The patient verbalizes her understanding.  All questions answered.  Consents signed.  The patient agrees to proceed to proceed as planned.

## 2017-07-11 ENCOUNTER — ANESTHESIA (OUTPATIENT)
Dept: SURGERY | Facility: OTHER | Age: 47
End: 2017-07-11
Payer: COMMERCIAL

## 2017-07-11 ENCOUNTER — HOSPITAL ENCOUNTER (OUTPATIENT)
Facility: OTHER | Age: 47
Discharge: HOME | End: 2017-07-12
Attending: OBSTETRICS & GYNECOLOGY | Admitting: OBSTETRICS & GYNECOLOGY
Payer: COMMERCIAL

## 2017-07-11 DIAGNOSIS — Z90.710 S/P LAPAROSCOPIC HYSTERECTOMY: Primary | ICD-10-CM

## 2017-07-11 DIAGNOSIS — N92.1 MENORRHAGIA WITH IRREGULAR CYCLE: ICD-10-CM

## 2017-07-11 DIAGNOSIS — N92.0 MENORRHAGIA: ICD-10-CM

## 2017-07-11 PROBLEM — G25.81 RESTLESS LEG SYNDROME: Status: ACTIVE | Noted: 2017-07-11

## 2017-07-11 PROBLEM — I10 HYPERTENSION: Status: ACTIVE | Noted: 2017-07-11

## 2017-07-11 LAB — POCT GLUCOSE: 77 MG/DL (ref 70–110)

## 2017-07-11 PROCEDURE — 58573 TLH W/T/O UTERUS OVER 250 G: CPT | Mod: 22,,, | Performed by: OBSTETRICS & GYNECOLOGY

## 2017-07-11 PROCEDURE — 25000003 PHARM REV CODE 250: Performed by: OBSTETRICS & GYNECOLOGY

## 2017-07-11 PROCEDURE — 58662 LAPAROSCOPY EXCISE LESIONS: CPT | Mod: 51,,, | Performed by: OBSTETRICS & GYNECOLOGY

## 2017-07-11 PROCEDURE — 25000003 PHARM REV CODE 250: Performed by: NURSE ANESTHETIST, CERTIFIED REGISTERED

## 2017-07-11 PROCEDURE — 25000003 PHARM REV CODE 250: Performed by: ANESTHESIOLOGY

## 2017-07-11 PROCEDURE — 63600175 PHARM REV CODE 636 W HCPCS: Performed by: NURSE ANESTHETIST, CERTIFIED REGISTERED

## 2017-07-11 PROCEDURE — 63600175 PHARM REV CODE 636 W HCPCS: Performed by: ANESTHESIOLOGY

## 2017-07-11 PROCEDURE — 88305 TISSUE EXAM BY PATHOLOGIST: CPT | Performed by: PATHOLOGY

## 2017-07-11 PROCEDURE — 88307 TISSUE EXAM BY PATHOLOGIST: CPT | Mod: 26,,, | Performed by: PATHOLOGY

## 2017-07-11 PROCEDURE — 71000039 HC RECOVERY, EACH ADD'L HOUR: Performed by: OBSTETRICS & GYNECOLOGY

## 2017-07-11 PROCEDURE — 63600175 PHARM REV CODE 636 W HCPCS: Performed by: OBSTETRICS & GYNECOLOGY

## 2017-07-11 PROCEDURE — 27000221 HC OXYGEN, UP TO 24 HOURS

## 2017-07-11 PROCEDURE — 37000008 HC ANESTHESIA 1ST 15 MINUTES: Performed by: OBSTETRICS & GYNECOLOGY

## 2017-07-11 PROCEDURE — 94799 UNLISTED PULMONARY SVC/PX: CPT

## 2017-07-11 PROCEDURE — 37000009 HC ANESTHESIA EA ADD 15 MINS: Performed by: OBSTETRICS & GYNECOLOGY

## 2017-07-11 PROCEDURE — 36000711: Performed by: OBSTETRICS & GYNECOLOGY

## 2017-07-11 PROCEDURE — 88307 TISSUE EXAM BY PATHOLOGIST: CPT | Performed by: PATHOLOGY

## 2017-07-11 PROCEDURE — 27201423 OPTIME MED/SURG SUP & DEVICES STERILE SUPPLY: Performed by: OBSTETRICS & GYNECOLOGY

## 2017-07-11 PROCEDURE — 36000710: Performed by: OBSTETRICS & GYNECOLOGY

## 2017-07-11 PROCEDURE — 88305 TISSUE EXAM BY PATHOLOGIST: CPT | Mod: 26,,, | Performed by: PATHOLOGY

## 2017-07-11 PROCEDURE — 71000033 HC RECOVERY, INTIAL HOUR: Performed by: OBSTETRICS & GYNECOLOGY

## 2017-07-11 RX ORDER — ROCURONIUM BROMIDE 10 MG/ML
INJECTION, SOLUTION INTRAVENOUS
Status: DISCONTINUED | OUTPATIENT
Start: 2017-07-11 | End: 2017-07-11

## 2017-07-11 RX ORDER — KETOROLAC TROMETHAMINE 30 MG/ML
INJECTION, SOLUTION INTRAMUSCULAR; INTRAVENOUS
Status: DISCONTINUED | OUTPATIENT
Start: 2017-07-11 | End: 2017-07-11

## 2017-07-11 RX ORDER — MIDAZOLAM HYDROCHLORIDE 1 MG/ML
INJECTION INTRAMUSCULAR; INTRAVENOUS
Status: DISCONTINUED | OUTPATIENT
Start: 2017-07-11 | End: 2017-07-11

## 2017-07-11 RX ORDER — FENTANYL CITRATE 50 UG/ML
INJECTION, SOLUTION INTRAMUSCULAR; INTRAVENOUS
Status: DISCONTINUED | OUTPATIENT
Start: 2017-07-11 | End: 2017-07-11

## 2017-07-11 RX ORDER — LIDOCAINE HCL/PF 100 MG/5ML
SYRINGE (ML) INTRAVENOUS
Status: DISCONTINUED | OUTPATIENT
Start: 2017-07-11 | End: 2017-07-11

## 2017-07-11 RX ORDER — MEPERIDINE HYDROCHLORIDE 50 MG/ML
12.5 INJECTION INTRAMUSCULAR; INTRAVENOUS; SUBCUTANEOUS ONCE AS NEEDED
Status: DISCONTINUED | OUTPATIENT
Start: 2017-07-11 | End: 2017-07-11 | Stop reason: HOSPADM

## 2017-07-11 RX ORDER — DIPHENHYDRAMINE HCL 25 MG
25 CAPSULE ORAL EVERY 4 HOURS PRN
Status: DISCONTINUED | OUTPATIENT
Start: 2017-07-11 | End: 2017-07-12 | Stop reason: HOSPADM

## 2017-07-11 RX ORDER — FAMOTIDINE 20 MG/1
20 TABLET, FILM COATED ORAL
Status: COMPLETED | OUTPATIENT
Start: 2017-07-11 | End: 2017-07-11

## 2017-07-11 RX ORDER — HYDROMORPHONE HYDROCHLORIDE 1 MG/ML
0.5 INJECTION, SOLUTION INTRAMUSCULAR; INTRAVENOUS; SUBCUTANEOUS
Status: DISCONTINUED | OUTPATIENT
Start: 2017-07-11 | End: 2017-07-12 | Stop reason: HOSPADM

## 2017-07-11 RX ORDER — ACETAMINOPHEN 10 MG/ML
INJECTION, SOLUTION INTRAVENOUS
Status: DISCONTINUED | OUTPATIENT
Start: 2017-07-11 | End: 2017-07-11

## 2017-07-11 RX ORDER — SODIUM CHLORIDE, SODIUM LACTATE, POTASSIUM CHLORIDE, CALCIUM CHLORIDE 600; 310; 30; 20 MG/100ML; MG/100ML; MG/100ML; MG/100ML
INJECTION, SOLUTION INTRAVENOUS CONTINUOUS
Status: DISCONTINUED | OUTPATIENT
Start: 2017-07-11 | End: 2017-07-11

## 2017-07-11 RX ORDER — FENTANYL CITRATE 50 UG/ML
25 INJECTION, SOLUTION INTRAMUSCULAR; INTRAVENOUS EVERY 5 MIN PRN
Status: DISCONTINUED | OUTPATIENT
Start: 2017-07-11 | End: 2017-07-11 | Stop reason: HOSPADM

## 2017-07-11 RX ORDER — IBUPROFEN 600 MG/1
600 TABLET ORAL EVERY 6 HOURS
Status: DISCONTINUED | OUTPATIENT
Start: 2017-07-12 | End: 2017-07-12 | Stop reason: HOSPADM

## 2017-07-11 RX ORDER — GLYCOPYRROLATE 0.2 MG/ML
INJECTION INTRAMUSCULAR; INTRAVENOUS
Status: DISCONTINUED | OUTPATIENT
Start: 2017-07-11 | End: 2017-07-11

## 2017-07-11 RX ORDER — MIDAZOLAM HYDROCHLORIDE 5 MG/ML
4 INJECTION INTRAMUSCULAR; INTRAVENOUS ONCE AS NEEDED
Status: DISCONTINUED | OUTPATIENT
Start: 2017-07-11 | End: 2017-07-11 | Stop reason: HOSPADM

## 2017-07-11 RX ORDER — PROPOFOL 10 MG/ML
VIAL (ML) INTRAVENOUS
Status: DISCONTINUED | OUTPATIENT
Start: 2017-07-11 | End: 2017-07-11

## 2017-07-11 RX ORDER — ONDANSETRON 2 MG/ML
INJECTION INTRAMUSCULAR; INTRAVENOUS
Status: DISCONTINUED | OUTPATIENT
Start: 2017-07-11 | End: 2017-07-11

## 2017-07-11 RX ORDER — SODIUM CHLORIDE 0.9 % (FLUSH) 0.9 %
3 SYRINGE (ML) INJECTION
Status: DISCONTINUED | OUTPATIENT
Start: 2017-07-11 | End: 2017-07-11

## 2017-07-11 RX ORDER — HYDROMORPHONE HYDROCHLORIDE 2 MG/ML
0.4 INJECTION, SOLUTION INTRAMUSCULAR; INTRAVENOUS; SUBCUTANEOUS EVERY 5 MIN PRN
Status: DISCONTINUED | OUTPATIENT
Start: 2017-07-11 | End: 2017-07-11 | Stop reason: HOSPADM

## 2017-07-11 RX ORDER — DEXAMETHASONE SODIUM PHOSPHATE 4 MG/ML
INJECTION, SOLUTION INTRA-ARTICULAR; INTRALESIONAL; INTRAMUSCULAR; INTRAVENOUS; SOFT TISSUE
Status: DISCONTINUED | OUTPATIENT
Start: 2017-07-11 | End: 2017-07-11

## 2017-07-11 RX ORDER — HYDROCODONE BITARTRATE AND ACETAMINOPHEN 5; 325 MG/1; MG/1
1 TABLET ORAL EVERY 4 HOURS PRN
Status: DISCONTINUED | OUTPATIENT
Start: 2017-07-11 | End: 2017-07-12 | Stop reason: HOSPADM

## 2017-07-11 RX ORDER — ONDANSETRON 8 MG/1
8 TABLET, ORALLY DISINTEGRATING ORAL EVERY 8 HOURS PRN
Status: DISCONTINUED | OUTPATIENT
Start: 2017-07-11 | End: 2017-07-12 | Stop reason: HOSPADM

## 2017-07-11 RX ORDER — HYDROCODONE BITARTRATE AND ACETAMINOPHEN 10; 325 MG/1; MG/1
1 TABLET ORAL EVERY 4 HOURS PRN
Status: DISCONTINUED | OUTPATIENT
Start: 2017-07-11 | End: 2017-07-12 | Stop reason: HOSPADM

## 2017-07-11 RX ORDER — LIDOCAINE HYDROCHLORIDE 10 MG/ML
1 INJECTION, SOLUTION EPIDURAL; INFILTRATION; INTRACAUDAL; PERINEURAL ONCE
Status: DISCONTINUED | OUTPATIENT
Start: 2017-07-11 | End: 2017-07-11 | Stop reason: HOSPADM

## 2017-07-11 RX ORDER — SIMETHICONE 80 MG
80 TABLET,CHEWABLE ORAL EVERY 4 HOURS PRN
Status: DISCONTINUED | OUTPATIENT
Start: 2017-07-11 | End: 2017-07-12 | Stop reason: HOSPADM

## 2017-07-11 RX ORDER — NEOSTIGMINE METHYLSULFATE 1 MG/ML
INJECTION, SOLUTION INTRAVENOUS
Status: DISCONTINUED | OUTPATIENT
Start: 2017-07-11 | End: 2017-07-11

## 2017-07-11 RX ORDER — DEXTROSE MONOHYDRATE AND SODIUM CHLORIDE 5; .9 G/100ML; G/100ML
INJECTION, SOLUTION INTRAVENOUS CONTINUOUS
Status: DISCONTINUED | OUTPATIENT
Start: 2017-07-11 | End: 2017-07-11

## 2017-07-11 RX ORDER — OXYCODONE HYDROCHLORIDE 5 MG/1
5 TABLET ORAL
Status: DISCONTINUED | OUTPATIENT
Start: 2017-07-11 | End: 2017-07-11 | Stop reason: HOSPADM

## 2017-07-11 RX ORDER — SODIUM CHLORIDE, SODIUM LACTATE, POTASSIUM CHLORIDE, CALCIUM CHLORIDE 600; 310; 30; 20 MG/100ML; MG/100ML; MG/100ML; MG/100ML
INJECTION, SOLUTION INTRAVENOUS CONTINUOUS
Status: DISCONTINUED | OUTPATIENT
Start: 2017-07-11 | End: 2017-07-12

## 2017-07-11 RX ADMIN — ONDANSETRON 4 MG: 2 INJECTION INTRAMUSCULAR; INTRAVENOUS at 04:07

## 2017-07-11 RX ADMIN — ROCURONIUM BROMIDE 10 MG: 10 INJECTION INTRAVENOUS at 02:07

## 2017-07-11 RX ADMIN — PROPOFOL 200 MG: 10 INJECTION, EMULSION INTRAVENOUS at 01:07

## 2017-07-11 RX ADMIN — SODIUM CHLORIDE, SODIUM LACTATE, POTASSIUM CHLORIDE, AND CALCIUM CHLORIDE: .6; .31; .03; .02 INJECTION, SOLUTION INTRAVENOUS at 09:07

## 2017-07-11 RX ADMIN — FENTANYL CITRATE 50 MCG: 50 INJECTION, SOLUTION INTRAMUSCULAR; INTRAVENOUS at 02:07

## 2017-07-11 RX ADMIN — CARBOXYMETHYLCELLULOSE SODIUM 2 DROP: 2.5 SOLUTION/ DROPS OPHTHALMIC at 01:07

## 2017-07-11 RX ADMIN — ROCURONIUM BROMIDE 10 MG: 10 INJECTION INTRAVENOUS at 01:07

## 2017-07-11 RX ADMIN — FENTANYL CITRATE 50 MCG: 50 INJECTION, SOLUTION INTRAMUSCULAR; INTRAVENOUS at 03:07

## 2017-07-11 RX ADMIN — FENTANYL CITRATE 100 MCG: 50 INJECTION, SOLUTION INTRAMUSCULAR; INTRAVENOUS at 01:07

## 2017-07-11 RX ADMIN — LIDOCAINE HYDROCHLORIDE 100 MG: 20 INJECTION, SOLUTION INTRAVENOUS at 01:07

## 2017-07-11 RX ADMIN — ROCURONIUM BROMIDE 10 MG: 10 INJECTION INTRAVENOUS at 03:07

## 2017-07-11 RX ADMIN — MIDAZOLAM HYDROCHLORIDE 3 MG: 1 INJECTION, SOLUTION INTRAMUSCULAR; INTRAVENOUS at 01:07

## 2017-07-11 RX ADMIN — DEXAMETHASONE SODIUM PHOSPHATE 8 MG: 4 INJECTION, SOLUTION INTRAMUSCULAR; INTRAVENOUS at 02:07

## 2017-07-11 RX ADMIN — HYDROMORPHONE HYDROCHLORIDE 0.4 MG: 2 INJECTION, SOLUTION INTRAMUSCULAR; INTRAVENOUS; SUBCUTANEOUS at 06:07

## 2017-07-11 RX ADMIN — SODIUM CHLORIDE, SODIUM LACTATE, POTASSIUM CHLORIDE, AND CALCIUM CHLORIDE: 600; 310; 30; 20 INJECTION, SOLUTION INTRAVENOUS at 01:07

## 2017-07-11 RX ADMIN — ROCURONIUM BROMIDE 10 MG: 10 INJECTION INTRAVENOUS at 04:07

## 2017-07-11 RX ADMIN — CEFAZOLIN SODIUM 3 ML: 2 SOLUTION INTRAVENOUS at 01:07

## 2017-07-11 RX ADMIN — SODIUM CHLORIDE, SODIUM LACTATE, POTASSIUM CHLORIDE, AND CALCIUM CHLORIDE: 600; 310; 30; 20 INJECTION, SOLUTION INTRAVENOUS at 02:07

## 2017-07-11 RX ADMIN — FAMOTIDINE 20 MG: 20 TABLET, FILM COATED ORAL at 12:07

## 2017-07-11 RX ADMIN — KETOROLAC TROMETHAMINE 30 MG: 30 INJECTION, SOLUTION INTRAMUSCULAR; INTRAVENOUS at 04:07

## 2017-07-11 RX ADMIN — NEOSTIGMINE METHYLSULFATE 5 MG: 1 INJECTION INTRAVENOUS at 04:07

## 2017-07-11 RX ADMIN — ROCURONIUM BROMIDE 40 MG: 10 INJECTION INTRAVENOUS at 01:07

## 2017-07-11 RX ADMIN — GLYCOPYRROLATE 0.8 MG: 0.2 INJECTION, SOLUTION INTRAMUSCULAR; INTRAVENOUS at 04:07

## 2017-07-11 RX ADMIN — OXYCODONE HYDROCHLORIDE 5 MG: 5 TABLET ORAL at 06:07

## 2017-07-11 RX ADMIN — DEXTROSE AND SODIUM CHLORIDE: 5; .9 INJECTION, SOLUTION INTRAVENOUS at 08:07

## 2017-07-11 RX ADMIN — MIDAZOLAM HYDROCHLORIDE 2 MG: 1 INJECTION, SOLUTION INTRAMUSCULAR; INTRAVENOUS at 01:07

## 2017-07-11 RX ADMIN — ACETAMINOPHEN 1000 MG: 10 INJECTION, SOLUTION INTRAVENOUS at 02:07

## 2017-07-11 NOTE — TRANSFER OF CARE
"Anesthesia Transfer of Care Note    Patient: Eleazar Solorzano    Procedure(s) Performed: Procedure(s) (LRB):  HYSTERECTOMY-TOTAL LAPAROSCOPIC (TLH) (N/A)  CYSTOSCOPY (N/A)    Patient location: PACU    Anesthesia Type: general    Transport from OR: Transported from OR on room air with adequate spontaneous ventilation    Post pain: adequate analgesia    Post assessment: no apparent anesthetic complications and tolerated procedure well    Post vital signs: stable    Level of consciousness: awake, alert and oriented    Nausea/Vomiting: no nausea/vomiting    Complications: none    Transfer of care protocol was followed      Last vitals:   Visit Vitals  BP (!) 152/81 (BP Location: Right arm, Patient Position: Lying, BP Method: Automatic)   Pulse 62   Temp 36.8 °C (98.2 °F) (Oral)   Resp 16   Ht 5' 9" (1.753 m)   Wt 117 kg (258 lb)   LMP 06/21/2017 (Exact Date)   SpO2 100%   BMI 38.10 kg/m²     "

## 2017-07-11 NOTE — INTERVAL H&P NOTE
The patient has been examined and the H&P has been reviewed:    I concur with the findings and no changes have occurred since H&P was written.    Anesthesia/Surgery risks, benefits and alternative options discussed and understood by patient/family.      Active Hospital Problems    Diagnosis  POA    Menorrhagia [N92.0]  Yes      Resolved Hospital Problems    Diagnosis Date Resolved POA   No resolved problems to display.     Amber Luu M.D.  PGY-4 OB/GYN

## 2017-07-11 NOTE — BRIEF OP NOTE
[facility]  Brief Operative Note    SUMMARY     Surgery Date: 7/11/2017     Surgeon(s) and Role:     * Amber Cowart MD - Primary     * Amber Luu MD - Resident - Assisting    Pre-op Diagnosis:  Menorrhagia with irregular cycle [N92.1]    Post-op Diagnosis:  Post-Op Diagnosis Codes:     * Menorrhagia with irregular cycle [N92.1]    Procedure(s) (LRB):  HYSTERECTOMY-TOTAL LAPAROSCOPIC (TLH) (N/A)  CYSTOSCOPY (N/A)    Anesthesia: General    Description of Procedure:   1. Dense adhesions of uterus to anterior abdominal wall  2. TLH/BS performed without complication  3. Left Ovarian cystectomy performed  3. Cystoscopy performed with bilateral efflux noted from UOs and not bladder injury      Estimated Blood Loss: 500 mL         Specimens:   Specimen (12h ago through future)    Start     Ordered    Pending  Specimen to Pathology - Surgery  Once     Comments:  1. Uterus, cervix, bilateral tubes2. Left ovarian cyst wall      Pending        Amber Luu M.D.  PGY-4 OB/GYN

## 2017-07-11 NOTE — ANESTHESIA POSTPROCEDURE EVALUATION
"Anesthesia Post Evaluation    Patient: Eleazar Solorzano    Procedure(s) Performed: Procedure(s) (LRB):  HYSTERECTOMY-TOTAL LAPAROSCOPIC (TLH) (N/A)  CYSTOSCOPY (N/A)  SALPINGECTOMY-LAPAROSCOPIC (Bilateral)    Final Anesthesia Type: general  Patient location during evaluation: PACU  Patient participation: Yes- Able to Participate  Level of consciousness: oriented and awake  Post-procedure vital signs: reviewed and stable  Pain management: adequate  Airway patency: patent  PONV status at discharge: No PONV  Anesthetic complications: no      Cardiovascular status: hemodynamically stable  Respiratory status: unassisted, spontaneous ventilation and room air  Hydration status: euvolemic  Follow-up not needed.        Visit Vitals  BP (!) 155/81 (BP Location: Left arm, Patient Position: Lying, BP Method: Automatic)   Pulse (!) 50   Temp 37.1 °C (98.8 °F) (Oral)   Resp 16   Ht 5' 9" (1.753 m)   Wt 117 kg (258 lb)   LMP 06/21/2017 (Exact Date)   SpO2 100%   BMI 38.10 kg/m²       Pain/Donald Score: Pain Assessment Performed: Yes (7/11/2017  5:45 PM)  Presence of Pain: denies (7/11/2017  5:45 PM)  Pain Rating Prior to Med Admin: 2 (7/11/2017  6:06 PM)  Donadl Score: 9 (7/11/2017  5:45 PM)      "

## 2017-07-12 VITALS
BODY MASS INDEX: 38.21 KG/M2 | TEMPERATURE: 98 F | RESPIRATION RATE: 18 BRPM | HEART RATE: 74 BPM | SYSTOLIC BLOOD PRESSURE: 132 MMHG | HEIGHT: 69 IN | DIASTOLIC BLOOD PRESSURE: 68 MMHG | OXYGEN SATURATION: 99 % | WEIGHT: 258 LBS

## 2017-07-12 PROCEDURE — 25000003 PHARM REV CODE 250: Performed by: OBSTETRICS & GYNECOLOGY

## 2017-07-12 RX ORDER — HYDROCODONE BITARTRATE AND ACETAMINOPHEN 5; 325 MG/1; MG/1
1 TABLET ORAL EVERY 4 HOURS PRN
Qty: 30 TABLET | Refills: 0 | Status: SHIPPED | OUTPATIENT
Start: 2017-07-12 | End: 2017-08-18

## 2017-07-12 RX ORDER — IBUPROFEN 600 MG/1
600 TABLET ORAL EVERY 6 HOURS
Qty: 30 TABLET | Refills: 0 | Status: SHIPPED | OUTPATIENT
Start: 2017-07-12 | End: 2019-07-25

## 2017-07-12 RX ADMIN — HYDROCODONE BITARTRATE AND ACETAMINOPHEN 1 TABLET: 5; 325 TABLET ORAL at 08:07

## 2017-07-12 RX ADMIN — IBUPROFEN 600 MG: 600 TABLET ORAL at 12:07

## 2017-07-12 RX ADMIN — HYDROCODONE BITARTRATE AND ACETAMINOPHEN 1 TABLET: 10; 325 TABLET ORAL at 03:07

## 2017-07-12 RX ADMIN — IBUPROFEN 600 MG: 600 TABLET ORAL at 01:07

## 2017-07-12 RX ADMIN — HYDROCODONE BITARTRATE AND ACETAMINOPHEN 1 TABLET: 10; 325 TABLET ORAL at 01:07

## 2017-07-12 NOTE — PROGRESS NOTES
[facility]  Obstetrics & Gynecology  Progress Note    Patient Name: Eleazar Solorzano  MRN: [id number]  Admission Date: 7/11/2017  Primary Care Provider: Marcy Flowers MD  Principal Problem: S/P laparoscopic hysterectomy    Subjective:     HPI:  47yo P2 with AUB-L admitted for scheduled TLH/BS    Interval History: POD#1. Doing well. Pain well controlled. Tolerating clears overnight, plans to order regular diet for breakfast this AM. Pain well controlled with po pain meds. Denies N/V, CP, SOB. Has not yet ambulated. Alejandre in place, will remove now.     Scheduled Meds:   ibuprofen  600 mg Oral Q6H     Continuous Infusions:   lactated Ringers 125 mL/hr at 07/12/17 0600     PRN Meds:diphenhydrAMINE, hydrocodone-acetaminophen 10-325mg, hydrocodone-acetaminophen 5-325mg, HYDROmorphone, ondansetron, promethazine (PHENERGAN) IVPB, simethicone    Review of patient's allergies indicates:   Allergen Reactions    Adhesive Dermatitis       Objective:     Vital Signs (Most Recent):  Temp: 98 °F (36.7 °C) (07/12/17 0444)  Pulse: 71 (07/12/17 0444)  Resp: 18 (07/12/17 0444)  BP: 135/73 (07/12/17 0444)  SpO2: 100 % (07/12/17 0444) Vital Signs (24h Range):  Temp:  [98 °F (36.7 °C)-98.8 °F (37.1 °C)] 98 °F (36.7 °C)  Pulse:  [50-71] 71  Resp:  [16-20] 18  SpO2:  [98 %-100 %] 100 %  BP: (122-160)/(67-83) 135/73     Weight: 117 kg (258 lb)  Body mass index is 38.1 kg/m².  Patient's last menstrual period was 06/21/2017 (exact date).    I&O (Last 24H):    Intake/Output Summary (Last 24 hours) at 07/12/17 0642  Last data filed at 07/12/17 0600   Gross per 24 hour   Intake             4650 ml   Output             3150 ml   Net             1500 ml         Laboratory:  Lab Results   Component Value Date    WBC 3.94 (L) 10/22/2010    HGB 11.6 (L) 06/30/2017    HCT 35.4 (L) 06/30/2017    MCV 75.4 (L) 10/22/2010     10/22/2010         Physical Exam:   Constitutional: She is oriented to person, place,  and time. She appears well-developed and well-nourished.    HENT:   Head: Normocephalic and atraumatic.     Neck: Normal range of motion.    Cardiovascular: Normal rate, regular rhythm and normal heart sounds.     Pulmonary/Chest: Effort normal and breath sounds normal.        Abdominal: Soft. Bowel sounds are normal. She exhibits abdominal incision (laparoscopic sites c/d/i with steri-strips and bandaids in place). She exhibits no distension. There is no tenderness. There is no rebound and no guarding.             Musculoskeletal: Normal range of motion and moves all extremeties.   SCDs in place       Neurological: She is alert and oriented to person, place, and time.    Skin: Skin is warm and dry.    Psychiatric: She has a normal mood and affect.       Assessment/Plan:     Restless leg syndrome    - stable  - restart home meds upon discharge        Hypertension    - BP: (122-160)/(67-83) 135/73  - restart home meds upon discharge        Menorrhagia    Now S/p hysterectomy        * S/P laparoscopic hysterectomy/BS/Cystoscopy/LOV cystectomy    - Continue routine post-op advances  - Pain: Continue po pain meds (ibuprofen and Norco), pain well controlled  - UOP: adequate, guerrero in place; will remove NOW  - Encourage ambulation  - Encourage IS  - Pre Op H/H .11/35, follow up labs in AM  - DC IVF, tolerating po  - Regular diet  - Antiemetics prn nausea/vomiting.  - Simethicone prn for gas pain              Amber Luu MD  Obstetrics & Gynecology  [facility]

## 2017-07-12 NOTE — DISCHARGE SUMMARY
[facility]  Obstetrics & Gynecology  Discharge Summary    Patient Name: Eleazar Solorzano  MRN: [id number]  Admission Date: 7/11/2017  Hospital Length of Stay: 0 days  Discharge Date and Time:  07/12/2017 6:49 AM  Attending Physician: Amber Cowart MD   Discharging Provider: Amber Luu MD  Primary Care Provider: Marcy Flowers MD    HPI:  45yo P2 with AUB-L admitted for scheduled TLH/BS    Hospital Course:  07/11/2017 - TLH/BS/LOV cystectomy/Cysto performed without complication.  07/12/2017 - Doing well POD#1, meeting all milestones, ready for DC home    Procedure(s) (LRB):  HYSTERECTOMY-TOTAL LAPAROSCOPIC (TLH) (N/A)  CYSTOSCOPY (N/A)  SALPINGECTOMY-LAPAROSCOPIC (Bilateral)         Significant Diagnostic Studies:   Lab Results   Component Value Date    WBC 3.94 (L) 10/22/2010    HGB 11.6 (L) 06/30/2017    HCT 35.4 (L) 06/30/2017    MCV 75.4 (L) 10/22/2010     10/22/2010       Pending Diagnostic Studies:     None        Final Active Diagnoses:    Diagnosis Date Noted POA    PRINCIPAL PROBLEM:  S/P laparoscopic hysterectomy/BS/Cystoscopy/LOV cystectomy [Z90.710] 07/11/2017 No    Menorrhagia [N92.0] 07/11/2017 Yes    Hypertension [I10] 07/11/2017 Yes    Restless leg syndrome [G25.81] 07/11/2017 Yes      Problems Resolved During this Admission:    Diagnosis Date Noted Date Resolved POA        Discharged Condition: good    Disposition: Home or Self Care    Follow Up:  Follow-up Information     Amber Cowart MD In 6 weeks.    Specialties:  Obstetrics, Obstetrics and Gynecology  Why:  Post-op Check  Contact information:  [address] [place] [id number] [phone]                 Patient Instructions:     Diet general     Activity as tolerated     Other restrictions (specify):   Order Comments: PELVIC REST, NOTHING IN THE VAGINA X 6 WEEKS. UNTIL CLEARED BY DR. COWART     Call MD for:  temperature >100.4     Call MD for:  persistent nausea  and vomiting or diarrhea     Call MD for:  redness, tenderness, or signs of infection (pain, swelling, redness, odor or green/yellow discharge around incision site)     Call MD for:  difficulty breathing or increased cough     Call MD for:  persistent dizziness, light-headedness, or visual disturbances     Remove dressing in 24 hours       Medications:  Reconciled Home Medications:   Current Discharge Medication List      START taking these medications    Details   hydrocodone-acetaminophen 5-325mg (NORCO) 5-325 mg per tablet Take 1 tablet by mouth every 4 (four) hours as needed.  Qty: 30 tablet, Refills: 0    Associated Diagnoses: S/P laparoscopic hysterectomy      ibuprofen (ADVIL,MOTRIN) 600 MG tablet Take 1 tablet (600 mg total) by mouth every 6 (six) hours.  Qty: 30 tablet, Refills: 0    Associated Diagnoses: S/P laparoscopic hysterectomy         CONTINUE these medications which have NOT CHANGED    Details   losartan-hydrochlorothiazide 50-12.5 mg (HYZAAR) 50-12.5 mg per tablet Take 1 tablet by mouth once daily.      baclofen (LIORESAL) 10 MG tablet Take 10 mg by mouth once daily at 6am.      ferrous sulfate (IRON, FERROUS SULFATE,) 325 mg (65 mg iron) Tab tablet Take 325 mg by mouth daily with breakfast. Stopped oral due to iron infusion      gabapentin (NEURONTIN) 300 MG capsule TAKE 2 CAPSULE 3 TIMES DAILY.  Refills: 3      GLATIRAMER ACETATE (COPAXONE 40MG/ML) Inject 1 Syringe into the skin 3 (three) times a week.      methyl salicylate-menthol (SALONPAS) 10-3 % PtMd Apply topically.      ropinirole (REQUIP XL) 2 mg 24 hr tablet Take 2 mg by mouth every evening.  Refills: 0      vitamin D 1000 units Tab Take 1,000 Units by mouth once daily.             Amber Luu MD  Obstetrics & Gynecology  [facility]

## 2017-07-12 NOTE — HOSPITAL COURSE
07/11/2017 - TLH/IVANIA/LOV cystectomy/Cysto performed without complication.  07/12/2017 - Doing well POD#1, meeting all milestones, ready for DC home

## 2017-07-12 NOTE — SUBJECTIVE & OBJECTIVE
Interval History: POD#1. Doing well. Pain well controlled. Tolerating clears overnight, plans to order regular diet for breakfast this AM. Pain well controlled with po pain meds. Denies N/V, CP, SOB. Has not yet ambulated. Alejandre in place, will remove now.     Scheduled Meds:   ibuprofen  600 mg Oral Q6H     Continuous Infusions:   lactated Ringers 125 mL/hr at 07/12/17 0600     PRN Meds:diphenhydrAMINE, hydrocodone-acetaminophen 10-325mg, hydrocodone-acetaminophen 5-325mg, HYDROmorphone, ondansetron, promethazine (PHENERGAN) IVPB, simethicone    Review of patient's allergies indicates:   Allergen Reactions    Adhesive Dermatitis       Objective:     Vital Signs (Most Recent):  Temp: 98 °F (36.7 °C) (07/12/17 0444)  Pulse: 71 (07/12/17 0444)  Resp: 18 (07/12/17 0444)  BP: 135/73 (07/12/17 0444)  SpO2: 100 % (07/12/17 0444) Vital Signs (24h Range):  Temp:  [98 °F (36.7 °C)-98.8 °F (37.1 °C)] 98 °F (36.7 °C)  Pulse:  [50-71] 71  Resp:  [16-20] 18  SpO2:  [98 %-100 %] 100 %  BP: (122-160)/(67-83) 135/73     Weight: 117 kg (258 lb)  Body mass index is 38.1 kg/m².  Patient's last menstrual period was 06/21/2017 (exact date).    I&O (Last 24H):    Intake/Output Summary (Last 24 hours) at 07/12/17 0642  Last data filed at 07/12/17 0600   Gross per 24 hour   Intake             4650 ml   Output             3150 ml   Net             1500 ml         Laboratory:  Lab Results   Component Value Date    WBC 3.94 (L) 10/22/2010    HGB 11.6 (L) 06/30/2017    HCT 35.4 (L) 06/30/2017    MCV 75.4 (L) 10/22/2010     10/22/2010         Physical Exam:   Constitutional: She is oriented to person, place, and time. She appears well-developed and well-nourished.    HENT:   Head: Normocephalic and atraumatic.     Neck: Normal range of motion.    Cardiovascular: Normal rate, regular rhythm and normal heart sounds.     Pulmonary/Chest: Effort normal and breath sounds normal.        Abdominal: Soft. Bowel sounds are normal. She exhibits  abdominal incision (laparoscopic sites c/d/i with steri-strips and bandaids in place). She exhibits no distension. There is no tenderness. There is no rebound and no guarding.             Musculoskeletal: Normal range of motion and moves all extremeties.   SCDs in place       Neurological: She is alert and oriented to person, place, and time.    Skin: Skin is warm and dry.    Psychiatric: She has a normal mood and affect.

## 2017-07-12 NOTE — OP NOTE
OPERATIVE REPORT    DATE: 07/11/2017    PREOPERATIVE DIAGNOSIS  1.AUB-L    POSTOPERATIVE DIAGNOSIS  1.AUB-L    PROCEDURE:  1. Lysis of adhesions (approximately 45 minutes)  2. Total Laparoscopic Hysterectomy  3. Bilateral Salpingectomy  4. Left ovarian cystectomy  5. Cystoscopy    SURGEON: Dr. Amber Cowart    ASSISTANT: Amber Luu, PGY4    ANESTHESIA: General    COMPLICATIONS: None    EBL: 500 cc    IV FLUIDS: 1050 cc    URINE OUTPUT: 200 cc    FINDINGS:   1. Dense adhesions of uterus to anterior abdominal wall  2. TLH/BS performed without complication  3. Left Ovarian cystectomy performed  4. Normal survey of upper abdomen  5. Cystoscopy performed with bilateral efflux noted from UOs and not bladder injury       PROCEDURE: Patient was taking to the operating room where general anesthesia was administered and found to be adequate.  She was prepped and draped in the dorsal lithotomy position.  A right angle was placed in the vagina.  The anterior lip of the cervix was grasped with a single tooth tenaculum.  The uterus was sounded to approximately 10 cm.  A stay suture of 0-Vicryl was placed at 12 o'clock and 6 o'clock on the cervix.  A RENETTA manipulator was placed inside the endometrial cavity.      Gloves were changed.  A Veress needle was inserted into the umbilicus under tenting of the anterior abdominal wall.  Placement into the peritoneal cavity was confirmed via saline drop test.  The abdomen was insufflated to 15mm Hg using Carbon dioxide.  A 10 mm infraumbilical skin incision was made with the scalpel.  A 10 mm Optiview trocar was advanced through this incision.  Excellent hemostasis was noted.  The patient was placed in deep Trendelenburg.  An 5 mm left lateral skin incision was made with a scalpel and a 5 mm trocar was advanced through this incision under visualization of the camera.  A 5 mm right lateral skin incision was made with the scalpel.  A 5 mm trocar was advanced through this incision under  visualization of the camera.  Excellent hemostasis was noted.  Due to dense adhesions and large fibroid uterus, decision was made to proceed with LUQ 10mm port.  Prior to insertion it was confirmed that on OG tube was in place.  A 10 mm skin incision was made with the scalpel and a 10 mm trocar was inserted under visualization of the camera.      Dense adhesions were noted from the left side of the uterus to and left side wall as well as to the anterior uterus to the anterior abdominal wall. The adhesions of the anterior uterus to the anterior abdominal wall were carefully dissected.  When a clear plane could not be developed at the level where the bladder was expected, lysis of adhesions in this direction was stopped.      Attention was turned to the left round ligament.  This was cauterized and transected and continued anteriorly.  However, it could not be continued due to dense adhesions.  .  Attention was turned to the left tube, starting at the fimbriated end, the mesosalpinx of the tube was cauterized and transected down to the cornua, then the utero-ovarian ligament was cauterized and transected.  This was cauterized and transected and carried down to the level of the uterine vessels with some difficulty secondary to adhesions. The vessels were cauterized but not transected.  Attention was turned to the right round ligament.  It was cauterized and transected and continued anteriorly but again could not be continued due to dense adhesions.  Attention was turned to the right fallopian tube, starting at the fimbriated end the mesosalpinx of the tube was cauterized and transected to cornua, the left utero-ovarian ligament was then cauterized and transected.  This was carried down to the level of the uterine vessels.  The vessels were cauterized but not transected.  Attention was turned to the anterior portion of the cervix.  The bladder was dissected off the cervix with difficulty secondary to dense adhesions.  The bladder was back-filled with sterile milk to demarcate the borders. The area of dissection was noted to be well away from the bladder.  Once the bladder was sufficiently dissected away from the cervix, the anterior colpotomy was created.  This was continued to the level of the uterine vessels bilaterally.  Attention was turned to the posterior portion of the uterus.  The posterior colpotomy was created and carried around to the level of the uterine vessels bilaterally.  The left uterine vessels were again cauterized and transected.  The anterior and posterior colpotomies were connected.  The right uterine vessels were cauterized and transected.  The anterior and posterior colpotomies were connected.        The uterus and bilateral tubes were removed vaginally.  A moist laparotomy sponge inside of glove was placed in the vagina to maintain intraperitoneal pressure. A 2cm corpus luteum cyst was noted in the left ovary.  An incision was made over the ovary.  The ovarian cyst wall was identified and dissected away from the underlying ovary with [place] forceps.  This was sent to pathology. The vaginal cuff was reapproximated in an interrupted fashion using 0-Vicryl suture using the Endostitch.  The pelvis was copiously irrigated and suctioned.  Excellent hemostasis was noted.  Surgiflo was placed over the vaginal cuff and where lysis of adhesions had been undertaken.     The lap in glove was removed.  The vaginal cuff was palpated and found to be intact.  The guerrero catheter was removed.  A cystoscope was advanced through the urethra.  The bladder was irrigated to remove the sterile milk.  The bladder was inspected and found to be intact.  Both ureters were seen effluxing. urine  The cystoscope was removed and the guerrero catheter was again inserted through the urethra.      Attention was turned to the anterior abdominal wall. The abdomen was deflated and all trocars were removed.  The skin was closed with 4-0  Monocryl in a subcuticular fashion.  Sponge, lap, and needle counts were correct x 2.  The patient was taken to the recovery room in stable condition with the guerrero draining.    Amber Luu M.D.  PGY-4 OB/GYN    Attending statement    I was present and scrubbed for the entire surgical procedure    Amber Cowart MD, FACOG  Obstetrics and Gynecology

## 2017-07-12 NOTE — NURSING
VSS; pt voided, ambulated and tolerated meals; needs assessed hourly. Pain controlled with oral analgesics. Po fluids encouraged. Lap sites clean, dry and intact. No signs of infection noted to lap sites. Pt had scan dick pad bleeding. Teds in place. Bronchial hygiene measures encouraged hourly while awake. Pt given [facility] discharge sheet. Pt verbalized understanding to all discharge instructions including the need to attend follow up appointment.

## 2017-07-12 NOTE — PLAN OF CARE
Problem: Patient Care Overview  Goal: Plan of Care Review  Outcome: Ongoing (interventions implemented as appropriate)  O2 no longer in use.

## 2017-07-12 NOTE — ASSESSMENT & PLAN NOTE
- Continue routine post-op advances  - Pain: Continue po pain meds (ibuprofen and Norco), pain well controlled  - UOP: adequate, guerrero in place; will remove NOW  - Encourage ambulation  - Encourage IS  - Pre Op H/H .11/35, follow up labs in AM  - DC IVF, tolerating po  - Regular diet  - Antiemetics prn nausea/vomiting.  - Simethicone prn for gas pain

## 2017-07-17 ENCOUNTER — PATIENT MESSAGE (OUTPATIENT)
Dept: OBSTETRICS AND GYNECOLOGY | Facility: CLINIC | Age: 47
End: 2017-07-17

## 2017-07-27 ENCOUNTER — PATIENT MESSAGE (OUTPATIENT)
Dept: OBSTETRICS AND GYNECOLOGY | Facility: CLINIC | Age: 47
End: 2017-07-27

## 2017-08-18 ENCOUNTER — LAB VISIT (OUTPATIENT)
Dept: LAB | Facility: OTHER | Age: 47
End: 2017-08-18
Attending: OBSTETRICS & GYNECOLOGY
Payer: COMMERCIAL

## 2017-08-18 ENCOUNTER — OFFICE VISIT (OUTPATIENT)
Dept: OBSTETRICS AND GYNECOLOGY | Facility: CLINIC | Age: 47
End: 2017-08-18
Attending: OBSTETRICS & GYNECOLOGY
Payer: COMMERCIAL

## 2017-08-18 VITALS
WEIGHT: 249.31 LBS | BODY MASS INDEX: 36.93 KG/M2 | HEIGHT: 69 IN | SYSTOLIC BLOOD PRESSURE: 120 MMHG | DIASTOLIC BLOOD PRESSURE: 88 MMHG

## 2017-08-18 DIAGNOSIS — B00.2 ORAL HERPES: ICD-10-CM

## 2017-08-18 DIAGNOSIS — Z09 POSTOP CHECK: ICD-10-CM

## 2017-08-18 DIAGNOSIS — Z09 POSTOP CHECK: Primary | ICD-10-CM

## 2017-08-18 PROBLEM — N92.0 MENORRHAGIA: Status: RESOLVED | Noted: 2017-07-11 | Resolved: 2017-08-18

## 2017-08-18 LAB
BASOPHILS # BLD AUTO: 0.02 K/UL
BASOPHILS NFR BLD: 0.5 %
DIFFERENTIAL METHOD: ABNORMAL
EOSINOPHIL # BLD AUTO: 0.1 K/UL
EOSINOPHIL NFR BLD: 1.4 %
ERYTHROCYTE [DISTWIDTH] IN BLOOD BY AUTOMATED COUNT: 15.2 %
HCT VFR BLD AUTO: 37.4 %
HGB BLD-MCNC: 11.9 G/DL
IRON SERPL-MCNC: 33 UG/DL
LYMPHOCYTES # BLD AUTO: 1.4 K/UL
LYMPHOCYTES NFR BLD: 33.6 %
MCH RBC QN AUTO: 27.4 PG
MCHC RBC AUTO-ENTMCNC: 31.8 G/DL
MCV RBC AUTO: 86 FL
MONOCYTES # BLD AUTO: 0.4 K/UL
MONOCYTES NFR BLD: 9.2 %
NEUTROPHILS # BLD AUTO: 2.3 K/UL
NEUTROPHILS NFR BLD: 55.1 %
PLATELET # BLD AUTO: 316 K/UL
PMV BLD AUTO: 10.9 FL
RBC # BLD AUTO: 4.35 M/UL
SATURATED IRON: 8 %
TOTAL IRON BINDING CAPACITY: 397 UG/DL
TRANSFERRIN SERPL-MCNC: 268 MG/DL
WBC # BLD AUTO: 4.22 K/UL

## 2017-08-18 PROCEDURE — 99999 PR PBB SHADOW E&M-EST. PATIENT-LVL II: CPT | Mod: PBBFAC,,, | Performed by: OBSTETRICS & GYNECOLOGY

## 2017-08-18 PROCEDURE — 83540 ASSAY OF IRON: CPT

## 2017-08-18 PROCEDURE — 85025 COMPLETE CBC W/AUTO DIFF WBC: CPT

## 2017-08-18 PROCEDURE — 99024 POSTOP FOLLOW-UP VISIT: CPT | Mod: S$GLB,,, | Performed by: OBSTETRICS & GYNECOLOGY

## 2017-08-18 PROCEDURE — 36415 COLL VENOUS BLD VENIPUNCTURE: CPT

## 2017-08-18 RX ORDER — VALACYCLOVIR HYDROCHLORIDE 500 MG/1
500 TABLET, FILM COATED ORAL 2 TIMES DAILY
Qty: 10 TABLET | Refills: 0 | Status: SHIPPED | OUTPATIENT
Start: 2017-08-18 | End: 2019-07-25

## 2017-08-18 NOTE — PROGRESS NOTES
"CC: Postoperative visit    Eleazar Solorzano is a 46 y.o. female  presents for a postoperative visit s/p [facility], bilateral salpingectomy, cystoscopy, cystectomy on 2017.  Her postoperative course was uncomplicated.  She is doing well postoperative.    Reports restless leg syndrome has started again. Previously this happened when she was anemic and improved after iron transfusion.      Pathology showed:  FINAL PATHOLOGIC DIAGNOSIS  1. UTERUS, 276g:  CERVIX-NO SIGNIFICANT HISTOLOGICAL ABNORMALITY, NO EVIDENCE OF DYSPLASIA;  ENDOMETRIUM-SECRETORY PHASE;  MYOMETRIUM-LEIOMYOMATA, INTRAMURAL AND SUBSEROSAL; ADENOMYOMATA; ADENOMYOSIS;  SEROSA-ADHESIONS;  SEPARATE LEIOMYOMATA AND MYOMETRIAL TISSUE;  FALLOPIAN TUBES (LEFT AND RIGHT): NO SIGNIFICANT HISTOLOGICAL ABNORMALITY.  2. OVARY (LEFT CYST WALL, CLINICAL): CYSTIC CORPUS LUTEUM WITH HEMORRHAGE.    /88   Ht 5' 9" (1.753 m)   Wt 113.1 kg (249 lb 5.4 oz)   BMI 36.82 kg/m²     ROS:  GENERAL: No fever, chills, fatigability or weight loss.  SKIN: Reports fever blister.  VULVAR: No pain, no lesions and no itching.  VAGINAL: No relaxation, no itching, no discharge, no abnormal bleeding and no lesions.  ABDOMEN: No abdominal pain. Denies nausea. Denies vomiting. No diarrhea. No constipation  BREAST: Denies pain. No lumps. No discharge.  URINARY: No incontinence, no nocturia, no frequency and no dysuria.  CARDIOVASCULAR: No chest pain. No shortness of breath. No leg cramps.  NEUROLOGICAL: No headaches. No vision changes.    Physical Exam    INCISION: Clean, dry, intact.  Well healed.    PELVIC: Normal external genitalia without lesions.  Normal hair distribution.  Adequate perineal body, normal urethral meatus.  Vagina moist and well rugated without lesions or discharge.  Vaginal cuff intact.  Well healed.  Bimanual exam shows uterus to be surgically absent.  Adnexa without masses or tenderness.      1. Postop check  CBC auto differential    IRON AND " TIB   2. Oral herpes  valacyclovir (VALTREX) 500 MG tablet       Patient can return to normal activities but refrain from intercourse for 2-3 weeks.      Return to clinic in 1 year for well woman exam.

## 2019-07-25 ENCOUNTER — OFFICE VISIT (OUTPATIENT)
Dept: OBSTETRICS AND GYNECOLOGY | Facility: CLINIC | Age: 49
End: 2019-07-25
Attending: OBSTETRICS & GYNECOLOGY
Payer: COMMERCIAL

## 2019-07-25 VITALS
HEIGHT: 69 IN | SYSTOLIC BLOOD PRESSURE: 116 MMHG | WEIGHT: 230.19 LBS | DIASTOLIC BLOOD PRESSURE: 80 MMHG | BODY MASS INDEX: 34.09 KG/M2

## 2019-07-25 DIAGNOSIS — Z01.419 VISIT FOR GYNECOLOGIC EXAMINATION: Primary | ICD-10-CM

## 2019-07-25 DIAGNOSIS — Z12.31 ENCOUNTER FOR SCREENING MAMMOGRAM FOR BREAST CANCER: ICD-10-CM

## 2019-07-25 DIAGNOSIS — N76.1 CHRONIC VAGINITIS: ICD-10-CM

## 2019-07-25 PROCEDURE — 99999 PR PBB SHADOW E&M-EST. PATIENT-LVL III: CPT | Mod: PBBFAC,,, | Performed by: OBSTETRICS & GYNECOLOGY

## 2019-07-25 PROCEDURE — 99999 PR PBB SHADOW E&M-EST. PATIENT-LVL III: ICD-10-PCS | Mod: PBBFAC,,, | Performed by: OBSTETRICS & GYNECOLOGY

## 2019-07-25 PROCEDURE — 87801 DETECT AGNT MULT DNA AMPLI: CPT

## 2019-07-25 PROCEDURE — 99396 PR PREVENTIVE VISIT,EST,40-64: ICD-10-PCS | Mod: S$GLB,,, | Performed by: OBSTETRICS & GYNECOLOGY

## 2019-07-25 PROCEDURE — 87481 CANDIDA DNA AMP PROBE: CPT | Mod: 59

## 2019-07-25 PROCEDURE — 99396 PREV VISIT EST AGE 40-64: CPT | Mod: S$GLB,,, | Performed by: OBSTETRICS & GYNECOLOGY

## 2019-07-25 RX ORDER — VENLAFAXINE HYDROCHLORIDE 37.5 MG/1
CAPSULE, EXTENDED RELEASE ORAL
Refills: 11 | COMMUNITY
Start: 2019-07-17 | End: 2020-10-20

## 2019-07-25 RX ORDER — PREGABALIN 100 MG/1
CAPSULE ORAL 3 TIMES DAILY
COMMUNITY
Start: 2019-07-22 | End: 2022-12-21

## 2019-07-25 NOTE — PROGRESS NOTES
"CC: Well woman exam    Eleazar Solorzano is a 48 y.o. female  presents for a well woman exam.      She reports vaginal discharge and itching..      Past Medical History:   Diagnosis Date    Anemia     Hypertension     Lumbar pain     Restless leg syndrome      Past Surgical History:   Procedure Laterality Date     SECTION      CYSTOSCOPY N/A 2017    [facility]formed [facility]    dilatation & curretage      HYSTERECTOMY  2017    HYSTERECTOMY-TOTAL LAPAROSCOPIC (TLH) N/A 2017    [facility] at [facility]    SALPINGECTOMY-LAPAROSCOPIC Bilateral 2017    Performed by [facility] at [facility]     Family History   Problem Relation Age of Onset    Cancer Maternal Grandmother     Colon cancer Maternal Grandmother     Breast cancer Neg Hx     Ovarian cancer Neg Hx      Social History     Tobacco Use    Smoking status: Former Smoker    Smokeless tobacco: Never Used   Substance Use Topics    Alcohol use: Yes     Comment: rare    Drug use: No     OB History        3    Para   2    Term   2            AB   1    Living   2       SAB   1    TAB        Ectopic        Multiple        Live Births   2                 /80 (BP Location: Right arm, Patient Position: Sitting, BP Method: Large (Manual))   Ht 5' 9" (1.753 m)   Wt 104.4 kg (230 lb 2.6 oz)   LMP 2017 (Exact Date)   BMI 33.99 kg/m²     ROS:  GENERAL: Denies weight gain or weight loss. Feeling well overall.   SKIN: Denies rash or lesions.   HEAD: Denies head injury or headache.   NODES: Denies enlarged lymph nodes.   CHEST: Denies chest pain or shortness of breath.   CARDIOVASCULAR: Denies palpitations or left sided chest pain.   ABDOMEN: No abdominal pain, constipation, diarrhea, nausea, vomiting or rectal bleeding.   URINARY: No frequency, dysuria, hematuria, or burning on urination.  REPRODUCTIVE: See HPI.   BREASTS: The patient " performs breast self-examination and denies pain, lumps, or nipple discharge.   HEMATOLOGIC: No easy bruisability or excessive bleeding.   MUSCULOSKELETAL: Denies joint pain or swelling.   NEUROLOGIC: Denies syncope or weakness.   PSYCHIATRIC: Denies depression, anxiety or mood swings.    PE:   APPEARANCE: Well nourished, well developed, in no acute distress.  AFFECT: WNL, alert and oriented x 3.  SKIN: No acne or hirsutism.  NECK: Neck symmetric without masses or thyromegaly.  NODES: No inguinal, cervical, axillary or femoral lymph node enlargement.  CHEST: Good respiratory effort.   ABDOMEN: Soft. No tenderness or masses. No hepatosplenomegaly. No hernias.  BREASTS: Symmetrical, no skin changes or visible lesions. No palpable masses, nipple discharge bilaterally.  PELVIC: Normal external female genitalia without lesions. Normal hair distribution. Adequate perineal body, normal urethral meatus. Vagina atrophic without lesions, thin white discharge. No significant cystocele or rectocele. Bimanual exam shows uterus and cervix to be surgically absent. Adnexa without masses or tenderness.  EXTREMITIES: No edema.      ICD-10-CM ICD-9-CM    1. Visit for gynecologic examination Z01.419 V72.31    2. Chronic vaginitis N76.1 616.10 Vaginosis Screen by DNA Probe   3. Encounter for screening mammogram for breast cancer Z12.31 V76.12 Mammo Digital Screening Bilat      Mammo Digital Screening Bilat           Patient was counseled today on A.C.S. Pap guidelines and recommendations for yearly pelvic exams, mammograms and monthly self breast exams; to see her PCP for other health maintenance.     Follow up in about 1 year (around 7/25/2020).

## 2019-07-28 LAB
BACTERIAL VAGINOSIS DNA: NEGATIVE
CANDIDA GLABRATA DNA: NEGATIVE
CANDIDA KRUSEI DNA: NEGATIVE
CANDIDA RRNA VAG QL PROBE: NEGATIVE
T VAGINALIS RRNA GENITAL QL PROBE: NEGATIVE

## 2019-11-19 ENCOUNTER — PATIENT MESSAGE (OUTPATIENT)
Dept: OBSTETRICS AND GYNECOLOGY | Facility: CLINIC | Age: 49
End: 2019-11-19

## 2019-11-19 DIAGNOSIS — B00.2 ORAL HERPES: ICD-10-CM

## 2019-11-19 RX ORDER — VALACYCLOVIR HYDROCHLORIDE 500 MG/1
500 TABLET, FILM COATED ORAL 2 TIMES DAILY
Qty: 10 TABLET | Refills: 0 | Status: SHIPPED | OUTPATIENT
Start: 2019-11-19 | End: 2024-02-27

## 2019-11-19 RX ORDER — FLUCONAZOLE 200 MG/1
200 TABLET ORAL
Qty: 2 TABLET | Refills: 0 | Status: SHIPPED | OUTPATIENT
Start: 2019-11-19 | End: 2020-10-20

## 2020-03-16 ENCOUNTER — PATIENT MESSAGE (OUTPATIENT)
Dept: OBSTETRICS AND GYNECOLOGY | Facility: CLINIC | Age: 50
End: 2020-03-16

## 2020-03-16 DIAGNOSIS — N76.0 ACUTE VAGINITIS: Primary | ICD-10-CM

## 2020-03-17 RX ORDER — FLUCONAZOLE 150 MG/1
150 TABLET ORAL ONCE
Qty: 2 TABLET | Refills: 1 | Status: SHIPPED | OUTPATIENT
Start: 2020-03-17 | End: 2020-03-17

## 2020-10-15 ENCOUNTER — TELEPHONE (OUTPATIENT)
Dept: FAMILY MEDICINE | Facility: CLINIC | Age: 50
End: 2020-10-15

## 2020-10-15 NOTE — TELEPHONE ENCOUNTER
----- Message from Lucía Arias sent at 10/15/2020  2:23 PM CDT -----  Pt would like a sooner appt if available. Pt has seen blood in her stool. Please advise. Pt #[phone]

## 2020-10-20 ENCOUNTER — OFFICE VISIT (OUTPATIENT)
Dept: FAMILY MEDICINE | Facility: CLINIC | Age: 50
End: 2020-10-20
Payer: COMMERCIAL

## 2020-10-20 VITALS
SYSTOLIC BLOOD PRESSURE: 170 MMHG | BODY MASS INDEX: 38.36 KG/M2 | TEMPERATURE: 97 F | WEIGHT: 259 LBS | HEART RATE: 80 BPM | HEIGHT: 69 IN | DIASTOLIC BLOOD PRESSURE: 104 MMHG

## 2020-10-20 DIAGNOSIS — M54.16 LEFT LUMBAR RADICULITIS: ICD-10-CM

## 2020-10-20 DIAGNOSIS — Z23 FLU VACCINE NEED: ICD-10-CM

## 2020-10-20 DIAGNOSIS — K62.5 BRIGHT RED RECTAL BLEEDING: Primary | ICD-10-CM

## 2020-10-20 DIAGNOSIS — D50.8 IRON DEFICIENCY ANEMIA SECONDARY TO INADEQUATE DIETARY IRON INTAKE: ICD-10-CM

## 2020-10-20 DIAGNOSIS — I10 ESSENTIAL HYPERTENSION: ICD-10-CM

## 2020-10-20 DIAGNOSIS — Z80.0 FAMILY HISTORY OF COLON CANCER: ICD-10-CM

## 2020-10-20 DIAGNOSIS — G35 MULTIPLE SCLEROSIS: ICD-10-CM

## 2020-10-20 PROBLEM — M54.12 CERVICAL RADICULITIS: Status: ACTIVE | Noted: 2017-04-04

## 2020-10-20 PROCEDURE — 3077F SYST BP >= 140 MM HG: CPT | Mod: S$GLB,,, | Performed by: FAMILY MEDICINE

## 2020-10-20 PROCEDURE — 3008F BODY MASS INDEX DOCD: CPT | Mod: S$GLB,,, | Performed by: FAMILY MEDICINE

## 2020-10-20 PROCEDURE — 3008F PR BODY MASS INDEX (BMI) DOCUMENTED: ICD-10-PCS | Mod: S$GLB,,, | Performed by: FAMILY MEDICINE

## 2020-10-20 PROCEDURE — 90471 FLU VACCINE - QUADRIVALENT (RECOMBINANT) PRESERVATIVE FREE: ICD-10-PCS | Mod: S$GLB,,, | Performed by: FAMILY MEDICINE

## 2020-10-20 PROCEDURE — 90471 IMMUNIZATION ADMIN: CPT | Mod: S$GLB,,, | Performed by: FAMILY MEDICINE

## 2020-10-20 PROCEDURE — 3080F DIAST BP >= 90 MM HG: CPT | Mod: S$GLB,,, | Performed by: FAMILY MEDICINE

## 2020-10-20 PROCEDURE — 99214 OFFICE O/P EST MOD 30 MIN: CPT | Mod: 25,S$GLB,, | Performed by: FAMILY MEDICINE

## 2020-10-20 PROCEDURE — 3080F PR MOST RECENT DIASTOLIC BLOOD PRESSURE >= 90 MM HG: ICD-10-PCS | Mod: S$GLB,,, | Performed by: FAMILY MEDICINE

## 2020-10-20 PROCEDURE — 90682 RIV4 VACC RECOMBINANT DNA IM: CPT | Mod: S$GLB,,, | Performed by: FAMILY MEDICINE

## 2020-10-20 PROCEDURE — 90682 FLU VACCINE - QUADRIVALENT (RECOMBINANT) PRESERVATIVE FREE: ICD-10-PCS | Mod: S$GLB,,, | Performed by: FAMILY MEDICINE

## 2020-10-20 PROCEDURE — 99214 PR OFFICE/OUTPT VISIT, EST, LEVL IV, 30-39 MIN: ICD-10-PCS | Mod: 25,S$GLB,, | Performed by: FAMILY MEDICINE

## 2020-10-20 PROCEDURE — 3077F PR MOST RECENT SYSTOLIC BLOOD PRESSURE >= 140 MM HG: ICD-10-PCS | Mod: S$GLB,,, | Performed by: FAMILY MEDICINE

## 2020-10-20 RX ORDER — OLMESARTAN MEDOXOMIL AND HYDROCHLOROTHIAZIDE 20/12.5 20; 12.5 MG/1; MG/1
1 TABLET ORAL DAILY
Qty: 90 TABLET | Refills: 3 | Status: SHIPPED | OUTPATIENT
Start: 2020-10-20 | End: 2020-12-01

## 2020-10-20 RX ORDER — MULTIVITAMIN
1 TABLET ORAL DAILY
COMMUNITY

## 2020-10-20 NOTE — PROGRESS NOTES
SUBJECTIVE:    Patient ID: Eleazar Solorzano is a 50 y.o. female.    Chief Complaint: Regular Check Up; blood in stool x3 episodes, months apart; flu vaccine today; and pulling sensation in thigh    The patient hypertension and iron deficiency anemia presents for visit.  She was last seen by me 2 years ago.  She complains of 3 separate episodes of bright red blood in her stool over the last couple of months.  It is painless.  Symptoms began after hard stool.  She reports the other stools were not haed but she does strain.  Denies any nausea vomiting or diarrhea.  Has significant concern about the bleeding secondary to family history of colon cancer.  Patient has history of cervical radiculitis and multiple sclerosis.  She is having some persistent issues with low back pain radicular-type symptoms and difficulty and pain with ambulation.  Patient had some recent pain and cramping in her left thigh in a few days later she noted a bruise.  Unaware of any trauma. No swelling in the leg. Has a pulling in the back of her thigh when bending forward.  She has upcoming visit with her neurologist.  Has had some recent MRI and will have ongoing evaluation. Also noted to have cyst in her c-spine  Patient also with history of hypertension and has not had medication some time.  Values noted to be significantly elevated.      Past Medical History:   Diagnosis Date    Anemia     Hypertension     Lumbar pain     Restless leg syndrome      Past Surgical History:   Procedure Laterality Date     SECTION      dilatation & curretage      HYSTERECTOMY  2017     Family History   Problem Relation Age of Onset    Cancer Maternal Grandmother     Colon cancer Maternal Grandmother     Breast cancer Neg Hx     Ovarian cancer Neg Hx        Marital Status:   Alcohol History:  reports current alcohol use.  Tobacco History:  reports that she has quit smoking. She has never used smokeless tobacco.  Drug History:   reports no history of drug use.    Review of patient's allergies indicates:   Allergen Reactions    Adhesive Dermatitis       Current Outpatient Medications:     baclofen (LIORESAL) 10 MG tablet, Take 10 mg by mouth once daily at 6am., Disp: , Rfl:     GLATIRAMER ACETATE (COPAXONE 40MG/ML), Inject 1 Syringe into the skin 3 (three) times a week., Disp: , Rfl:     methyl salicylate-menthol (SALONPAS) 10-3 % PtMd, Apply topically., Disp: , Rfl:     multivitamin (ONE DAILY MULTIVITAMIN) per tablet, Take 1 tablet by mouth once daily., Disp: , Rfl:     pregabalin (LYRICA) 100 MG capsule, , Disp: , Rfl:     vitamin D 1000 units Tab, Take 1,000 Units by mouth once daily., Disp: , Rfl:     olmesartan-hydrochlorothiazide (BENICAR HCT) 20-12.5 mg per tablet, Take 1 tablet by mouth once daily., Disp: 90 tablet, Rfl: 3    valACYclovir (VALTREX) 500 MG tablet, Take 1 tablet (500 mg total) by mouth 2 (two) times daily. for 5 days, Disp: 10 tablet, Rfl: 0    Review of Systems   Constitutional: Positive for fatigue. Negative for activity change and unexpected weight change.   HENT: Negative for hearing loss, postnasal drip, sinus pressure, sore throat and voice change.    Eyes: Negative for photophobia and visual disturbance.   Respiratory: Negative for cough, shortness of breath and wheezing.    Cardiovascular: Negative for chest pain and palpitations.   Gastrointestinal: Negative for abdominal distention, abdominal pain, anal bleeding, constipation, diarrhea, nausea, rectal pain and vomiting.   Genitourinary: Negative for difficulty urinating, frequency, hematuria and urgency.   Musculoskeletal: Positive for back pain, gait problem and myalgias. Negative for arthralgias.   Skin: Negative for rash.   Neurological: Positive for weakness. Negative for light-headedness and headaches.   Hematological: Negative for adenopathy. Does not bruise/bleed easily.   Psychiatric/Behavioral: The patient is not nervous/anxious.        "    Objective:      Vitals:    10/20/20 1653   BP: (!) 170/104   Pulse: 80   Temp: 97.3 °F (36.3 °C)   Weight: 117.5 kg (259 lb)   Height: 5' 9" (1.753 m)     Physical Exam  Vitals signs reviewed.   Constitutional:       General: She is not in acute distress.     Appearance: Normal appearance. She is well-developed.   HENT:      Head: Normocephalic and atraumatic.      Right Ear: External ear normal.      Left Ear: External ear normal.      Nose: Nose normal.      Mouth/Throat:      Mouth: Mucous membranes are moist.   Eyes:      General: Lids are normal.      Conjunctiva/sclera: Conjunctivae normal.      Pupils: Pupils are equal, round, and reactive to light.   Neck:      Musculoskeletal: Full passive range of motion without pain and neck supple.      Thyroid: No thyromegaly.      Vascular: No JVD.      Trachea: No tracheal deviation.   Cardiovascular:      Rate and Rhythm: Normal rate and regular rhythm.      Chest Wall: PMI is not displaced.      Pulses: Normal pulses.      Heart sounds: Normal heart sounds.   Pulmonary:      Effort: Pulmonary effort is normal.      Breath sounds: Normal breath sounds.   Abdominal:      General: Bowel sounds are normal.      Palpations: Abdomen is soft.      Tenderness: There is no abdominal tenderness. There is no guarding or rebound.   Musculoskeletal:         General: No tenderness.        Arms:    Skin:     General: Skin is warm and dry.      Findings: No rash.   Neurological:      General: No focal deficit present.      Mental Status: She is alert and oriented to person, place, and time.   Psychiatric:         Mood and Affect: Mood normal.         Behavior: Behavior normal.           Assessment:       1. Bright red rectal bleeding    2. Flu vaccine need    3. Essential hypertension    4. Multiple sclerosis    5. Left lumbar radiculitis    6. Family history of colon cancer    7. Iron deficiency anemia secondary to inadequate dietary iron intake         Plan:       Bright red " rectal bleeding  Comments:  suspect internal hemorrhoid  Orders:  -     Ambulatory referral/consult to Gastroenterology; Future; Expected date: 10/27/2020    Flu vaccine need  -     Influenza - Quadrivalent (Recombinant) (PF)    Essential hypertension  -     olmesartan-hydrochlorothiazide (BENICAR HCT) 20-12.5 mg per tablet; Take 1 tablet by mouth once daily.  Dispense: 90 tablet; Refill: 3  -     Lipid Panel; Future; Expected date: 10/20/2020  -     Comprehensive Metabolic Panel; Future; Expected date: 10/20/2020    Multiple sclerosis    Left lumbar radiculitis    Family history of colon cancer  -     Ambulatory referral/consult to Gastroenterology; Future; Expected date: 10/27/2020    Iron deficiency anemia secondary to inadequate dietary iron intake  -     CBC auto differential; Future; Expected date: 10/20/2020  -     Iron; Future; Expected date: 10/20/2020      Follow up in about 4 weeks (around 11/17/2020) for HTN.

## 2020-10-28 LAB
ALBUMIN SERPL-MCNC: 4.3 G/DL (ref 3.6–5.1)
ALBUMIN/GLOB SERPL: 1.6 (CALC) (ref 1–2.5)
ALP SERPL-CCNC: 70 U/L (ref 31–125)
ALT SERPL-CCNC: 18 U/L (ref 6–29)
AST SERPL-CCNC: 17 U/L (ref 10–35)
BASOPHILS # BLD AUTO: 31 CELLS/UL (ref 0–200)
BASOPHILS NFR BLD AUTO: 0.8 %
BILIRUB SERPL-MCNC: 0.5 MG/DL (ref 0.2–1.2)
BUN SERPL-MCNC: 11 MG/DL (ref 7–25)
BUN/CREAT SERPL: ABNORMAL (CALC) (ref 6–22)
CALCIUM SERPL-MCNC: 11 MG/DL (ref 8.6–10.2)
CHLORIDE SERPL-SCNC: 105 MMOL/L (ref 98–110)
CHOLEST SERPL-MCNC: 142 MG/DL
CHOLEST/HDLC SERPL: 2.3 (CALC)
CO2 SERPL-SCNC: 26 MMOL/L (ref 20–32)
CREAT SERPL-MCNC: 0.72 MG/DL (ref 0.5–1.1)
EOSINOPHIL # BLD AUTO: 113 CELLS/UL (ref 15–500)
EOSINOPHIL NFR BLD AUTO: 2.9 %
ERYTHROCYTE [DISTWIDTH] IN BLOOD BY AUTOMATED COUNT: 12.3 % (ref 11–15)
GFRSERPLBLD MDRD-ARVRAT: 99 ML/MIN/1.73M2
GLOBULIN SER CALC-MCNC: 2.7 G/DL (CALC) (ref 1.9–3.7)
GLUCOSE SERPL-MCNC: 93 MG/DL (ref 65–99)
HCT VFR BLD AUTO: 42.5 % (ref 35–45)
HDLC SERPL-MCNC: 61 MG/DL
HGB BLD-MCNC: 14 G/DL (ref 11.7–15.5)
IRON SERPL-MCNC: 84 MCG/DL (ref 40–190)
LDLC SERPL CALC-MCNC: 67 MG/DL (CALC)
LYMPHOCYTES # BLD AUTO: 1447 CELLS/UL (ref 850–3900)
LYMPHOCYTES NFR BLD AUTO: 37.1 %
MCH RBC QN AUTO: 29.5 PG (ref 27–33)
MCHC RBC AUTO-ENTMCNC: 32.9 G/DL (ref 32–36)
MCV RBC AUTO: 89.7 FL (ref 80–100)
MONOCYTES # BLD AUTO: 566 CELLS/UL (ref 200–950)
MONOCYTES NFR BLD AUTO: 14.5 %
NEUTROPHILS # BLD AUTO: 1743 CELLS/UL (ref 1500–7800)
NEUTROPHILS NFR BLD AUTO: 44.7 %
NONHDLC SERPL-MCNC: 81 MG/DL (CALC)
PLATELET # BLD AUTO: 257 THOUSAND/UL (ref 140–400)
PMV BLD REES-ECKER: 10.1 FL (ref 7.5–12.5)
POTASSIUM SERPL-SCNC: 4.6 MMOL/L (ref 3.5–5.3)
PROT SERPL-MCNC: 7 G/DL (ref 6.1–8.1)
RBC # BLD AUTO: 4.74 MILLION/UL (ref 3.8–5.1)
SODIUM SERPL-SCNC: 138 MMOL/L (ref 135–146)
TRIGL SERPL-MCNC: 55 MG/DL
WBC # BLD AUTO: 3.9 THOUSAND/UL (ref 3.8–10.8)

## 2020-10-30 ENCOUNTER — TELEPHONE (OUTPATIENT)
Dept: FAMILY MEDICINE | Facility: CLINIC | Age: 50
End: 2020-10-30

## 2020-11-04 DIAGNOSIS — Z12.31 ENCOUNTER FOR SCREENING MAMMOGRAM FOR BREAST CANCER: Primary | ICD-10-CM

## 2020-11-14 ENCOUNTER — HOSPITAL ENCOUNTER (OUTPATIENT)
Dept: RADIOLOGY | Facility: HOSPITAL | Age: 50
Discharge: HOME | End: 2020-11-14
Attending: FAMILY MEDICINE
Payer: COMMERCIAL

## 2020-11-14 VITALS — HEIGHT: 69 IN | WEIGHT: 259.06 LBS | BODY MASS INDEX: 38.37 KG/M2

## 2020-11-14 DIAGNOSIS — Z12.31 ENCOUNTER FOR SCREENING MAMMOGRAM FOR BREAST CANCER: ICD-10-CM

## 2020-11-14 PROCEDURE — 77067 SCR MAMMO BI INCL CAD: CPT | Mod: TC,PO

## 2020-11-25 ENCOUNTER — TELEPHONE (OUTPATIENT)
Dept: FAMILY MEDICINE | Facility: CLINIC | Age: 50
End: 2020-11-25

## 2020-11-25 NOTE — TELEPHONE ENCOUNTER
----- Message from Joey Choi sent at 11/25/2020  2:52 PM CST -----  Regarding: Reschdule appt  Contact: Eleazar Dickson  Pt would like to know can she reschedule her appt on 12/01or move her appt time earlier that day ?? If not, She's off on the 10th and 11th of December ?? Please give the patient a call back ?? [phone]

## 2020-12-01 ENCOUNTER — OFFICE VISIT (OUTPATIENT)
Dept: FAMILY MEDICINE | Facility: CLINIC | Age: 50
End: 2020-12-01
Payer: COMMERCIAL

## 2020-12-01 VITALS
BODY MASS INDEX: 38.06 KG/M2 | WEIGHT: 257 LBS | SYSTOLIC BLOOD PRESSURE: 132 MMHG | HEIGHT: 69 IN | HEART RATE: 72 BPM | DIASTOLIC BLOOD PRESSURE: 86 MMHG

## 2020-12-01 DIAGNOSIS — E04.1 THYROID NODULE: ICD-10-CM

## 2020-12-01 DIAGNOSIS — G35 MULTIPLE SCLEROSIS: ICD-10-CM

## 2020-12-01 DIAGNOSIS — Z23 NEED FOR PNEUMOCOCCAL VACCINATION: ICD-10-CM

## 2020-12-01 DIAGNOSIS — M54.16 LUMBAR RADICULITIS: ICD-10-CM

## 2020-12-01 DIAGNOSIS — D50.8 IRON DEFICIENCY ANEMIA SECONDARY TO INADEQUATE DIETARY IRON INTAKE: ICD-10-CM

## 2020-12-01 DIAGNOSIS — I10 ESSENTIAL HYPERTENSION: Primary | ICD-10-CM

## 2020-12-01 DIAGNOSIS — M54.12 CERVICAL RADICULITIS: ICD-10-CM

## 2020-12-01 PROCEDURE — 3008F PR BODY MASS INDEX (BMI) DOCUMENTED: ICD-10-PCS | Mod: S$GLB,,, | Performed by: FAMILY MEDICINE

## 2020-12-01 PROCEDURE — 90732 PNEUMOCOCCAL POLYSACCHARIDE VACCINE 23-VALENT =>2YO SQ IM: ICD-10-PCS | Mod: S$GLB,,, | Performed by: FAMILY MEDICINE

## 2020-12-01 PROCEDURE — 90471 PNEUMOCOCCAL POLYSACCHARIDE VACCINE 23-VALENT =>2YO SQ IM: ICD-10-PCS | Mod: S$GLB,,, | Performed by: FAMILY MEDICINE

## 2020-12-01 PROCEDURE — 90471 IMMUNIZATION ADMIN: CPT | Mod: S$GLB,,, | Performed by: FAMILY MEDICINE

## 2020-12-01 PROCEDURE — 99214 PR OFFICE/OUTPT VISIT, EST, LEVL IV, 30-39 MIN: ICD-10-PCS | Mod: 25,S$GLB,, | Performed by: FAMILY MEDICINE

## 2020-12-01 PROCEDURE — 99214 OFFICE O/P EST MOD 30 MIN: CPT | Mod: 25,S$GLB,, | Performed by: FAMILY MEDICINE

## 2020-12-01 PROCEDURE — 3079F DIAST BP 80-89 MM HG: CPT | Mod: S$GLB,,, | Performed by: FAMILY MEDICINE

## 2020-12-01 PROCEDURE — 3075F PR MOST RECENT SYSTOLIC BLOOD PRESS GE 130-139MM HG: ICD-10-PCS | Mod: S$GLB,,, | Performed by: FAMILY MEDICINE

## 2020-12-01 PROCEDURE — 3075F SYST BP GE 130 - 139MM HG: CPT | Mod: S$GLB,,, | Performed by: FAMILY MEDICINE

## 2020-12-01 PROCEDURE — 3008F BODY MASS INDEX DOCD: CPT | Mod: S$GLB,,, | Performed by: FAMILY MEDICINE

## 2020-12-01 PROCEDURE — 90732 PPSV23 VACC 2 YRS+ SUBQ/IM: CPT | Mod: S$GLB,,, | Performed by: FAMILY MEDICINE

## 2020-12-01 PROCEDURE — 3079F PR MOST RECENT DIASTOLIC BLOOD PRESSURE 80-89 MM HG: ICD-10-PCS | Mod: S$GLB,,, | Performed by: FAMILY MEDICINE

## 2020-12-01 RX ORDER — OLMESARTAN MEDOXOMIL AND HYDROCHLOROTHIAZIDE 40/12.5 40; 12.5 MG/1; MG/1
1 TABLET ORAL DAILY
Qty: 90 TABLET | Refills: 3 | Status: SHIPPED | OUTPATIENT
Start: 2020-12-01 | End: 2022-10-10 | Stop reason: SDUPTHER

## 2020-12-01 NOTE — PROGRESS NOTES
SUBJECTIVE:    Patient ID: Eleazar Solorzano is a 50 y.o. female.    Chief Complaint: Regular Check Up, c/o L leg/muscle pain/soreness, and discuss cyst on thyroid per neuro    Patient with hypertension and multiple sclerosis presents for recheck.  She has recently resumed blood pressure medications.  Initial blood pressure is noted to be elevated today.  She has no side effects of medications.  Recent visit with her neurologist and she had MRI of the C-spine.  She does have a history of degenerative disc disease and spinal stenosis with some radicular symptoms.  However the MRI also demonstrated a nodule in her thyroid.  Previous thyroid labs have been normal and patient has no complaints of hyperthyroidism.  It is recommended that this be additionally followed up.  Patient has history of anemia and vitamin-D deficiency she continues to take her supplements and her vitamin-D was recently increased.  She has some persistent concerns with left leg pain, numbness and the appearance of some muscle wasting in her left hip.  She is currently doing aqua therapy and will also have nerve conduction studies.  Mammogram completed and is normal.  The patient is up-to-date with flu shot.  She requires pneumonia vaccination secondary to being placed on biologic therapy.      Office Visit on 10/20/2020   Component Date Value Ref Range Status    WBC 10/27/2020 3.9  3.8 - 10.8 Thousand/uL Final    RBC 10/27/2020 4.74  3.80 - 5.10 Million/uL Final    Hemoglobin 10/27/2020 14.0  11.7 - 15.5 g/dL Final    Hematocrit 10/27/2020 42.5  35.0 - 45.0 % Final    MCV 10/27/2020 89.7  80.0 - 100.0 fL Final    MCH 10/27/2020 29.5  27.0 - 33.0 pg Final    MCHC 10/27/2020 32.9  32.0 - 36.0 g/dL Final    RDW 10/27/2020 12.3  11.0 - 15.0 % Final    Platelets 10/27/2020 257  140 - 400 Thousand/uL Final    MPV 10/27/2020 10.1  7.5 - 12.5 fL Final    Neutrophils Absolute 10/27/2020 1,743  1,500 - 7,800 cells/uL Final    Lymph #  10/27/2020 1,447  850 - 3,900 cells/uL Final    Mono # 10/27/2020 566  200 - 950 cells/uL Final    Eos # 10/27/2020 113  15 - 500 cells/uL Final    Baso # 10/27/2020 31  0 - 200 cells/uL Final    Neutrophils Relative 10/27/2020 44.7  % Final    Lymph % 10/27/2020 37.1  % Final    Mono % 10/27/2020 14.5  % Final    Eosinophil % 10/27/2020 2.9  % Final    Basophil % 10/27/2020 0.8  % Final    Iron 10/27/2020 84  40 - 190 mcg/dL Final    Cholesterol 10/27/2020 142  <200 mg/dL Final    HDL 10/27/2020 61  > OR = 50 mg/dL Final    Triglycerides 10/27/2020 55  <150 mg/dL Final    LDL Cholesterol 10/27/2020 67  mg/dL (calc) Final    HDL/Cholesterol Ratio 10/27/2020 2.3  <5.0 (calc) Final    Non HDL Chol. (LDL+VLDL) 10/27/2020 81  <130 mg/dL (calc) Final    Glucose 10/27/2020 93  65 - 99 mg/dL Final    BUN 10/27/2020 11  7 - 25 mg/dL Final    Creatinine 10/27/2020 0.72  0.50 - 1.10 mg/dL Final    eGFR if non African American 10/27/2020 99  > OR = 60 mL/min/1.73m2 Final    eGFR if  10/27/2020 115  > OR = 60 mL/min/1.73m2 Final    BUN/Creatinine Ratio 10/27/2020 NOT APPLICABLE  6 - 22 (calc) Final    Sodium 10/27/2020 138  135 - 146 mmol/L Final    Potassium 10/27/2020 4.6  3.5 - 5.3 mmol/L Final    Chloride 10/27/2020 105  98 - 110 mmol/L Final    CO2 10/27/2020 26  20 - 32 mmol/L Final    Calcium 10/27/2020 11.0* 8.6 - 10.2 mg/dL Final    Total Protein 10/27/2020 7.0  6.1 - 8.1 g/dL Final    Albumin 10/27/2020 4.3  3.6 - 5.1 g/dL Final    Globulin, Total 10/27/2020 2.7  1.9 - 3.7 g/dL (calc) Final    Albumin/Globulin Ratio 10/27/2020 1.6  1.0 - 2.5 (calc) Final    Total Bilirubin 10/27/2020 0.5  0.2 - 1.2 mg/dL Final    Alkaline Phosphatase 10/27/2020 70  31 - 125 U/L Final    AST 10/27/2020 17  10 - 35 U/L Final    ALT 10/27/2020 18  6 - 29 U/L Final       Past Medical History:   Diagnosis Date    Anemia     Hypertension     Lumbar pain     Restless leg syndrome       Past Surgical History:   Procedure Laterality Date     SECTION      dilatation & curretage      HYSTERECTOMY  2017     Family History   Problem Relation Age of Onset    Cancer Maternal Grandmother     Colon cancer Maternal Grandmother     Breast cancer Neg Hx     Ovarian cancer Neg Hx        Marital Status:   Alcohol History:  reports current alcohol use.  Tobacco History:  reports that she has quit smoking. She has never used smokeless tobacco.  Drug History:  reports no history of drug use.    Review of patient's allergies indicates:   Allergen Reactions    Adhesive Dermatitis       Current Outpatient Medications:     baclofen (LIORESAL) 10 MG tablet, Take 10 mg by mouth once daily at 6am., Disp: , Rfl:     GLATIRAMER ACETATE (COPAXONE 40MG/ML), Inject 1 Syringe into the skin 3 (three) times a week., Disp: , Rfl:     methyl salicylate-menthol (SALONPAS) 10-3 % PtMd, Apply topically., Disp: , Rfl:     multivitamin (ONE DAILY MULTIVITAMIN) per tablet, Take 1 tablet by mouth once daily., Disp: , Rfl:     pregabalin (LYRICA) 100 MG capsule, Take by mouth 3 (three) times daily. , Disp: , Rfl:     vitamin D 1000 units Tab, Take 1,000 Units by mouth once daily., Disp: , Rfl:     olmesartan-hydrochlorothiazide (BENICAR HCT) 40-12.5 mg Tab, Take 1 tablet by mouth once daily., Disp: 90 tablet, Rfl: 3    valACYclovir (VALTREX) 500 MG tablet, Take 1 tablet (500 mg total) by mouth 2 (two) times daily. for 5 days, Disp: 10 tablet, Rfl: 0    Review of Systems   Constitutional: Negative for activity change, fatigue and unexpected weight change.   HENT: Negative for hearing loss, postnasal drip, sinus pressure, sore throat and voice change.    Eyes: Negative for photophobia and visual disturbance.   Respiratory: Negative for cough, shortness of breath and wheezing.    Cardiovascular: Negative for chest pain, palpitations and leg swelling.   Gastrointestinal: Negative for constipation, diarrhea  "and nausea.   Genitourinary: Negative for difficulty urinating, frequency, hematuria and urgency.   Musculoskeletal: Positive for arthralgias, back pain and neck pain.   Skin: Negative for rash.   Neurological: Negative for weakness, light-headedness and headaches.   Hematological: Negative for adenopathy. Does not bruise/bleed easily.   Psychiatric/Behavioral: The patient is not nervous/anxious.           Objective:      Vitals:    12/01/20 0811 12/01/20 0849   BP: (!) 136/96 132/86   Pulse: 72    Weight: 116.6 kg (257 lb)    Height: 5' 9" (1.753 m)      Physical Exam  Vitals signs reviewed.   Constitutional:       General: She is not in acute distress.     Appearance: Normal appearance. She is well-developed.   HENT:      Head: Normocephalic and atraumatic.      Right Ear: External ear normal.      Left Ear: External ear normal.      Nose: Nose normal.      Mouth/Throat:      Mouth: Mucous membranes are moist.   Eyes:      General: Lids are normal.      Conjunctiva/sclera: Conjunctivae normal.      Pupils: Pupils are equal, round, and reactive to light.   Neck:      Musculoskeletal: Full passive range of motion without pain and neck supple.      Thyroid: No thyromegaly.      Vascular: No JVD.      Trachea: No tracheal deviation.   Cardiovascular:      Rate and Rhythm: Normal rate and regular rhythm.      Chest Wall: PMI is not displaced.      Pulses: Normal pulses.      Heart sounds: Normal heart sounds.   Pulmonary:      Effort: Pulmonary effort is normal.      Breath sounds: Normal breath sounds.   Abdominal:      General: Bowel sounds are normal.      Palpations: Abdomen is soft.      Tenderness: There is no abdominal tenderness. There is no guarding or rebound.   Musculoskeletal: Normal range of motion.         General: No tenderness.   Skin:     General: Skin is warm and dry.      Findings: No rash.   Neurological:      General: No focal deficit present.      Mental Status: She is alert and oriented to " person, place, and time.   Psychiatric:         Mood and Affect: Mood normal.         Behavior: Behavior normal.           Assessment:       1. Essential hypertension    2. Iron deficiency anemia secondary to inadequate dietary iron intake    3. Multiple sclerosis    4. Thyroid nodule    5. Cervical radiculitis    6. Lumbar radiculitis    7. Need for pneumococcal vaccination         Plan:       Essential hypertension  -     olmesartan-hydrochlorothiazide (BENICAR HCT) 40-12.5 mg Tab; Take 1 tablet by mouth once daily.  Dispense: 90 tablet; Refill: 3    Iron deficiency anemia secondary to inadequate dietary iron intake    Multiple sclerosis  -     Pneumococcal Polysaccharide Vaccine (23 Valent) (SQ/IM)    Thyroid nodule  -     US Soft Tissue Head Neck Thyroid; Future; Expected date: 12/01/2020    Cervical radiculitis    Lumbar radiculitis    Need for pneumococcal vaccination  -     Pneumococcal Polysaccharide Vaccine (23 Valent) (SQ/IM)      Follow up in about 4 months (around 4/1/2021) for HTN.

## 2020-12-14 ENCOUNTER — HOSPITAL ENCOUNTER (OUTPATIENT)
Dept: RADIOLOGY | Facility: HOSPITAL | Age: 50
Discharge: HOME | End: 2020-12-14
Attending: FAMILY MEDICINE
Payer: COMMERCIAL

## 2020-12-14 DIAGNOSIS — E04.1 THYROID NODULE: ICD-10-CM

## 2020-12-14 PROCEDURE — 76536 US EXAM OF HEAD AND NECK: CPT | Mod: TC

## 2021-03-29 ENCOUNTER — TELEPHONE (OUTPATIENT)
Dept: FAMILY MEDICINE | Facility: CLINIC | Age: 51
End: 2021-03-29

## 2021-03-30 ENCOUNTER — OFFICE VISIT (OUTPATIENT)
Dept: FAMILY MEDICINE | Facility: CLINIC | Age: 51
End: 2021-03-30
Payer: COMMERCIAL

## 2021-03-30 VITALS
BODY MASS INDEX: 39.84 KG/M2 | DIASTOLIC BLOOD PRESSURE: 88 MMHG | SYSTOLIC BLOOD PRESSURE: 118 MMHG | HEIGHT: 69 IN | WEIGHT: 269 LBS | HEART RATE: 66 BPM

## 2021-03-30 DIAGNOSIS — G35 MULTIPLE SCLEROSIS: ICD-10-CM

## 2021-03-30 DIAGNOSIS — Z23 NEED FOR TETANUS, DIPHTHERIA, AND ACELLULAR PERTUSSIS (TDAP) VACCINE: ICD-10-CM

## 2021-03-30 DIAGNOSIS — Z00.00 ANNUAL PHYSICAL EXAM: Primary | ICD-10-CM

## 2021-03-30 DIAGNOSIS — I10 ESSENTIAL HYPERTENSION: ICD-10-CM

## 2021-03-30 DIAGNOSIS — D50.8 IRON DEFICIENCY ANEMIA SECONDARY TO INADEQUATE DIETARY IRON INTAKE: ICD-10-CM

## 2021-03-30 PROCEDURE — 90715 TDAP VACCINE 7 YRS/> IM: CPT | Mod: S$GLB,,, | Performed by: FAMILY MEDICINE

## 2021-03-30 PROCEDURE — 3008F PR BODY MASS INDEX (BMI) DOCUMENTED: ICD-10-PCS | Mod: S$GLB,,, | Performed by: FAMILY MEDICINE

## 2021-03-30 PROCEDURE — 3079F PR MOST RECENT DIASTOLIC BLOOD PRESSURE 80-89 MM HG: ICD-10-PCS | Mod: S$GLB,,, | Performed by: FAMILY MEDICINE

## 2021-03-30 PROCEDURE — 3079F DIAST BP 80-89 MM HG: CPT | Mod: S$GLB,,, | Performed by: FAMILY MEDICINE

## 2021-03-30 PROCEDURE — 90715 TDAP VACCINE GREATER THAN OR EQUAL TO 7YO IM: ICD-10-PCS | Mod: S$GLB,,, | Performed by: FAMILY MEDICINE

## 2021-03-30 PROCEDURE — 99396 PREV VISIT EST AGE 40-64: CPT | Mod: 25,S$GLB,, | Performed by: FAMILY MEDICINE

## 2021-03-30 PROCEDURE — 90471 IMMUNIZATION ADMIN: CPT | Mod: S$GLB,,, | Performed by: FAMILY MEDICINE

## 2021-03-30 PROCEDURE — 3074F SYST BP LT 130 MM HG: CPT | Mod: S$GLB,,, | Performed by: FAMILY MEDICINE

## 2021-03-30 PROCEDURE — 3008F BODY MASS INDEX DOCD: CPT | Mod: S$GLB,,, | Performed by: FAMILY MEDICINE

## 2021-03-30 PROCEDURE — 99396 PR PREVENTIVE VISIT,EST,40-64: ICD-10-PCS | Mod: 25,S$GLB,, | Performed by: FAMILY MEDICINE

## 2021-03-30 PROCEDURE — 90471 TDAP VACCINE GREATER THAN OR EQUAL TO 7YO IM: ICD-10-PCS | Mod: S$GLB,,, | Performed by: FAMILY MEDICINE

## 2021-03-30 PROCEDURE — 3074F PR MOST RECENT SYSTOLIC BLOOD PRESSURE < 130 MM HG: ICD-10-PCS | Mod: S$GLB,,, | Performed by: FAMILY MEDICINE

## 2021-04-30 ENCOUNTER — IMMUNIZATION (OUTPATIENT)
Dept: PRIMARY CARE CLINIC | Facility: CLINIC | Age: 51
End: 2021-04-30
Payer: COMMERCIAL

## 2021-04-30 DIAGNOSIS — Z23 NEED FOR VACCINATION: Primary | ICD-10-CM

## 2021-04-30 PROCEDURE — 91300 COVID-19, MRNA, LNP-S, PF, 30 MCG/0.3 ML DOSE VACCINE: ICD-10-PCS | Mod: S$GLB,,, | Performed by: FAMILY MEDICINE

## 2021-04-30 PROCEDURE — 0001A COVID-19, MRNA, LNP-S, PF, 30 MCG/0.3 ML DOSE VACCINE: CPT | Mod: CV19,S$GLB,, | Performed by: FAMILY MEDICINE

## 2021-04-30 PROCEDURE — 91300 COVID-19, MRNA, LNP-S, PF, 30 MCG/0.3 ML DOSE VACCINE: CPT | Mod: S$GLB,,, | Performed by: FAMILY MEDICINE

## 2021-04-30 PROCEDURE — 0001A COVID-19, MRNA, LNP-S, PF, 30 MCG/0.3 ML DOSE VACCINE: ICD-10-PCS | Mod: CV19,S$GLB,, | Performed by: FAMILY MEDICINE

## 2021-05-21 ENCOUNTER — IMMUNIZATION (OUTPATIENT)
Dept: PRIMARY CARE CLINIC | Facility: CLINIC | Age: 51
End: 2021-05-21
Payer: COMMERCIAL

## 2021-05-21 DIAGNOSIS — Z23 NEED FOR VACCINATION: Primary | ICD-10-CM

## 2021-05-21 PROCEDURE — 91300 COVID-19, MRNA, LNP-S, PF, 30 MCG/0.3 ML DOSE VACCINE: CPT | Mod: S$GLB,,, | Performed by: FAMILY MEDICINE

## 2021-05-21 PROCEDURE — 0002A COVID-19, MRNA, LNP-S, PF, 30 MCG/0.3 ML DOSE VACCINE: ICD-10-PCS | Mod: CV19,S$GLB,, | Performed by: FAMILY MEDICINE

## 2021-05-21 PROCEDURE — 0002A COVID-19, MRNA, LNP-S, PF, 30 MCG/0.3 ML DOSE VACCINE: CPT | Mod: CV19,S$GLB,, | Performed by: FAMILY MEDICINE

## 2021-05-21 PROCEDURE — 91300 COVID-19, MRNA, LNP-S, PF, 30 MCG/0.3 ML DOSE VACCINE: ICD-10-PCS | Mod: S$GLB,,, | Performed by: FAMILY MEDICINE

## 2021-11-01 ENCOUNTER — PATIENT MESSAGE (OUTPATIENT)
Dept: FAMILY MEDICINE | Facility: CLINIC | Age: 51
End: 2021-11-01
Payer: COMMERCIAL

## 2021-11-02 ENCOUNTER — PATIENT MESSAGE (OUTPATIENT)
Dept: FAMILY MEDICINE | Facility: CLINIC | Age: 51
End: 2021-11-02
Payer: COMMERCIAL

## 2022-03-04 ENCOUNTER — HOSPITAL ENCOUNTER (OUTPATIENT)
Dept: RADIOLOGY | Facility: HOSPITAL | Age: 52
Discharge: HOME | End: 2022-03-04
Attending: NEUROLOGICAL SURGERY
Payer: COMMERCIAL

## 2022-03-04 DIAGNOSIS — M54.12 BRACHIAL NEURITIS: Primary | ICD-10-CM

## 2022-03-04 DIAGNOSIS — M54.12 BRACHIAL NEURITIS: ICD-10-CM

## 2022-03-04 PROCEDURE — 72040 X-RAY EXAM NECK SPINE 2-3 VW: CPT | Mod: TC,PO

## 2022-04-18 ENCOUNTER — HOSPITAL ENCOUNTER (OUTPATIENT)
Dept: RADIOLOGY | Facility: HOSPITAL | Age: 52
Discharge: HOME | End: 2022-04-18
Attending: ORTHOPAEDIC SURGERY
Payer: COMMERCIAL

## 2022-04-18 DIAGNOSIS — M54.12 CERVICAL RADICULITIS: ICD-10-CM

## 2022-04-18 PROCEDURE — 72040 X-RAY EXAM NECK SPINE 2-3 VW: CPT | Mod: TC,PO

## 2022-07-12 ENCOUNTER — HOSPITAL ENCOUNTER (OUTPATIENT)
Dept: RADIOLOGY | Facility: HOSPITAL | Age: 52
Discharge: HOME | End: 2022-07-12
Attending: PHYSICIAN ASSISTANT
Payer: COMMERCIAL

## 2022-07-12 DIAGNOSIS — M54.12 CERVICAL RADICULOPATHY: ICD-10-CM

## 2022-07-12 DIAGNOSIS — M54.12 CERVICAL RADICULOPATHY: Primary | ICD-10-CM

## 2022-07-12 PROCEDURE — 72040 X-RAY EXAM NECK SPINE 2-3 VW: CPT | Mod: TC,PO

## 2022-08-30 ENCOUNTER — PATIENT MESSAGE (OUTPATIENT)
Dept: ADMINISTRATIVE | Facility: CLINIC | Age: 52
End: 2022-08-30
Payer: COMMERCIAL

## 2022-08-30 ENCOUNTER — NURSE TRIAGE (OUTPATIENT)
Dept: ADMINISTRATIVE | Facility: CLINIC | Age: 52
End: 2022-08-30
Payer: COMMERCIAL

## 2022-08-30 ENCOUNTER — OFFICE VISIT (OUTPATIENT)
Dept: URGENT CARE | Facility: CLINIC | Age: 52
End: 2022-08-30
Payer: COMMERCIAL

## 2022-08-30 VITALS
DIASTOLIC BLOOD PRESSURE: 95 MMHG | WEIGHT: 236 LBS | BODY MASS INDEX: 34.96 KG/M2 | HEIGHT: 69 IN | OXYGEN SATURATION: 98 % | HEART RATE: 70 BPM | TEMPERATURE: 99 F | RESPIRATION RATE: 16 BRPM | SYSTOLIC BLOOD PRESSURE: 154 MMHG

## 2022-08-30 DIAGNOSIS — R09.81 NASAL CONGESTION: ICD-10-CM

## 2022-08-30 DIAGNOSIS — U07.1 COVID: ICD-10-CM

## 2022-08-30 DIAGNOSIS — R05.9 COUGH: ICD-10-CM

## 2022-08-30 DIAGNOSIS — Z20.822 EXPOSURE TO COVID-19 VIRUS: Primary | ICD-10-CM

## 2022-08-30 LAB
CTP QC/QA: YES
SARS-COV-2 AG RESP QL IA.RAPID: POSITIVE

## 2022-08-30 PROCEDURE — 1159F MED LIST DOCD IN RCRD: CPT | Mod: CPTII,S$GLB,,

## 2022-08-30 PROCEDURE — 99214 OFFICE O/P EST MOD 30 MIN: CPT | Mod: S$GLB,,,

## 2022-08-30 PROCEDURE — 3080F DIAST BP >= 90 MM HG: CPT | Mod: CPTII,S$GLB,,

## 2022-08-30 PROCEDURE — 3077F PR MOST RECENT SYSTOLIC BLOOD PRESSURE >= 140 MM HG: ICD-10-PCS | Mod: CPTII,S$GLB,,

## 2022-08-30 PROCEDURE — 87811 SARS CORONAVIRUS 2 ANTIGEN POCT, MANUAL READ: ICD-10-PCS | Mod: QW,S$GLB,,

## 2022-08-30 PROCEDURE — 87811 SARS-COV-2 COVID19 W/OPTIC: CPT | Mod: QW,S$GLB,,

## 2022-08-30 PROCEDURE — 3008F BODY MASS INDEX DOCD: CPT | Mod: CPTII,S$GLB,,

## 2022-08-30 PROCEDURE — 99214 PR OFFICE/OUTPT VISIT, EST, LEVL IV, 30-39 MIN: ICD-10-PCS | Mod: S$GLB,,,

## 2022-08-30 PROCEDURE — 1159F PR MEDICATION LIST DOCUMENTED IN MEDICAL RECORD: ICD-10-PCS | Mod: CPTII,S$GLB,,

## 2022-08-30 PROCEDURE — 3008F PR BODY MASS INDEX (BMI) DOCUMENTED: ICD-10-PCS | Mod: CPTII,S$GLB,,

## 2022-08-30 PROCEDURE — 3080F PR MOST RECENT DIASTOLIC BLOOD PRESSURE >= 90 MM HG: ICD-10-PCS | Mod: CPTII,S$GLB,,

## 2022-08-30 PROCEDURE — 3077F SYST BP >= 140 MM HG: CPT | Mod: CPTII,S$GLB,,

## 2022-08-30 RX ORDER — AZELASTINE 1 MG/ML
1 SPRAY, METERED NASAL 2 TIMES DAILY
Qty: 30 ML | Refills: 0 | Status: SHIPPED | OUTPATIENT
Start: 2022-08-30 | End: 2024-02-27

## 2022-08-30 RX ORDER — BENZONATATE 200 MG/1
200 CAPSULE ORAL 3 TIMES DAILY PRN
Qty: 15 CAPSULE | Refills: 0 | Status: SHIPPED | OUTPATIENT
Start: 2022-08-30 | End: 2022-09-04

## 2022-08-30 NOTE — LETTER
August 30, 2022      [facility]  [address] [facility] [address]  Phone: [phone]       Patient: Eleazar Dickson   YOB: 1970  Date of Visit: 08/30/2022    To Whom It May Concern:    Charmaine Dickson  was at [facility] on 08/30/2022. The patient may return to work/school on 09/05/2022 if fever free for greater than 24 hours and symptoms resolving. If you have any questions or concerns, or if I can be of further assistance, please do not hesitate to contact me.    Sincerely,    Dorian Dickerson, NP

## 2022-08-30 NOTE — TELEPHONE ENCOUNTER
Pt called and she denies both programs for covid, Surveillance and Les 727 will remove task and she was given the number to [facility] if has any problems or questions to reach out      Reason for Disposition   Information only question and nurse able to answer    Protocols used: Information Only Call - No Triage-A-[place]

## 2022-08-30 NOTE — PATIENT INSTRUCTIONS
Symptomatic treatment to include:    Rest, increase fluid intake to include electrolyte replacement  Ibuprofen/Tylenol as directed for fever, sore throat, body aches  Zrytec and flonase for sinus symptoms  Tessalon perles cough pills as needed day or night  Mucinex D over the counter as directed for sinus congestion.  Coricidin HBP if you have high blood pressure.  Warm, salt water gargles, over the counter throat lozenges or sprays as desires.   ER for difficulty breathing not relieved by rest, excessive lethargy and/or change in mental status, oxygen level less than 93% or worsening of symptoms.   Follow CDC isolation guidelines as provided    If you decide to start paxlovid, this must be started within five days of symptoms onset.

## 2022-08-30 NOTE — PROGRESS NOTES
"Subjective:       Patient ID: Eleazar Dickson is a 51 y.o. female.    Vitals:  height is 5' 9" (1.753 m) and weight is 107 kg (236 lb). Her oral temperature is 98.8 °F (37.1 °C). Her blood pressure is 154/95 (abnormal) and her pulse is 70. Her respiration is 16 and oxygen saturation is 98%.     Chief Complaint: COVID-19 Concerns    Pt states " thinks he has been exposed to covid and now she is feeling ill; c/o runny nose, headaches, cough x's yesterday."      Constitution: Positive for chills. Negative for activity change, appetite change, sweating and fever.   HENT:  Positive for congestion and postnasal drip. Negative for ear pain, sinus pain, sinus pressure and sore throat.    Neck: Negative for neck pain.   Cardiovascular:  Negative for chest pain.   Eyes:  Negative for blurred vision.   Respiratory:  Positive for cough. Negative for chest tightness, sputum production and shortness of breath.    Gastrointestinal:  Negative for abdominal pain.   Neurological:  Positive for headaches. Negative for dizziness and history of vertigo.     Objective:      Physical Exam   Constitutional: She is oriented to person, place, and time.  Non-toxic appearance. She does not appear ill. No distress.   HENT:   Head: Normocephalic.   Nose: Nose normal.   Eyes: Conjunctivae are normal. Extraocular movement intact   Cardiovascular: Normal rate, normal heart sounds and normal pulses.   Pulmonary/Chest: Effort normal and breath sounds normal. No respiratory distress. She has no wheezes.   Neurological: no focal deficit. She is alert and oriented to person, place, and time.   Skin: Skin is not diaphoretic. Capillary refill takes 2 to 3 seconds.   Psychiatric: Her behavior is normal. Mood normal.       Assessment:       1. Exposure to COVID-19 virus    2. COVID    3. Cough    4. Nasal congestion          Plan:         Exposure to COVID-19 virus  -     SARS Coronavirus 2 Antigen, POCT Manual Read    COVID  -     " nirmatrelvir-ritonavir 300 mg (150 mg x 2)-100 mg copackaged tablets (EUA); Take 3 tablets by mouth 2 (two) times daily for 5 days. Each dose contains 2 nirmatrelvir (pink tablets) and 1 ritonavir (white tablet). Take all 3 tablets together  Dispense: 30 tablet; Refill: 0  -     COVID-19 Surveillance Program  -     pulse oximeter (PULSE OXIMETER) device; by Apply Externally route 2 (two) times a day. Use twice daily at 8 AM and 3 PM and [email or web address] as directed.  Dispense: 1 each; Refill: 0    Cough  -     benzonatate (TESSALON) 200 MG capsule; Take 1 capsule (200 mg total) by mouth 3 (three) times daily as needed for Cough.  Dispense: 15 capsule; Refill: 0    Nasal congestion  -     azelastine (ASTELIN) 137 mcg (0.1 %) nasal spray; 1 spray (137 mcg total) by Nasal route 2 (two) times daily.  Dispense: 30 mL; Refill: 0       3  Patient covid positive. Patient symptoms onset yesterday. Patient educated on risk versus benefit of paxlovid, patient is unsure at this time if she wants to take medication, patient given informational packet on paxlovid, risk, purpose and side effects, encouraged patient to discuss further with her PCP before starting, patient instructed medication must be started within five days of symptoms onset. Will do supportive treatment.

## 2022-09-29 ENCOUNTER — TELEPHONE (OUTPATIENT)
Dept: FAMILY MEDICINE | Facility: CLINIC | Age: 52
End: 2022-09-29

## 2022-09-29 NOTE — TELEPHONE ENCOUNTER
----- Message from Kim Moulton MA sent at 9/29/2022  2:54 PM CDT -----  Pt is calling to schedule an appt to be seen because she had covid in the beginning of the month and she needs a yearly check up. [phone]

## 2022-10-10 ENCOUNTER — OFFICE VISIT (OUTPATIENT)
Dept: FAMILY MEDICINE | Facility: CLINIC | Age: 52
End: 2022-10-10
Payer: COMMERCIAL

## 2022-10-10 VITALS
BODY MASS INDEX: 35.4 KG/M2 | WEIGHT: 239 LBS | HEIGHT: 69 IN | DIASTOLIC BLOOD PRESSURE: 100 MMHG | HEART RATE: 71 BPM | SYSTOLIC BLOOD PRESSURE: 150 MMHG | OXYGEN SATURATION: 98 %

## 2022-10-10 DIAGNOSIS — D50.8 IRON DEFICIENCY ANEMIA SECONDARY TO INADEQUATE DIETARY IRON INTAKE: ICD-10-CM

## 2022-10-10 DIAGNOSIS — E04.1 THYROID NODULE: ICD-10-CM

## 2022-10-10 DIAGNOSIS — Z12.11 COLON CANCER SCREENING: ICD-10-CM

## 2022-10-10 DIAGNOSIS — M79.89 LEFT LEG SWELLING: ICD-10-CM

## 2022-10-10 DIAGNOSIS — I10 ESSENTIAL HYPERTENSION: Primary | ICD-10-CM

## 2022-10-10 DIAGNOSIS — G35 MULTIPLE SCLEROSIS: ICD-10-CM

## 2022-10-10 DIAGNOSIS — S83.92XA SPRAIN OF LEFT KNEE, UNSPECIFIED LIGAMENT, INITIAL ENCOUNTER: ICD-10-CM

## 2022-10-10 DIAGNOSIS — Z12.31 ENCOUNTER FOR SCREENING MAMMOGRAM FOR MALIGNANT NEOPLASM OF BREAST: ICD-10-CM

## 2022-10-10 DIAGNOSIS — Z80.0 FAMILY HISTORY OF COLON CANCER: ICD-10-CM

## 2022-10-10 DIAGNOSIS — Z23 NEEDS FLU SHOT: ICD-10-CM

## 2022-10-10 DIAGNOSIS — H92.09 OTALGIA, UNSPECIFIED LATERALITY: ICD-10-CM

## 2022-10-10 PROCEDURE — 3077F PR MOST RECENT SYSTOLIC BLOOD PRESSURE >= 140 MM HG: ICD-10-PCS | Mod: CPTII,S$GLB,, | Performed by: FAMILY MEDICINE

## 2022-10-10 PROCEDURE — 3077F SYST BP >= 140 MM HG: CPT | Mod: CPTII,S$GLB,, | Performed by: FAMILY MEDICINE

## 2022-10-10 PROCEDURE — 90471 FLU VACCINE - QUADRIVALENT (RECOMBINANT) PRESERVATIVE FREE: ICD-10-PCS | Mod: S$GLB,,, | Performed by: FAMILY MEDICINE

## 2022-10-10 PROCEDURE — 1160F RVW MEDS BY RX/DR IN RCRD: CPT | Mod: CPTII,S$GLB,, | Performed by: FAMILY MEDICINE

## 2022-10-10 PROCEDURE — 1160F PR REVIEW ALL MEDS BY PRESCRIBER/CLIN PHARMACIST DOCUMENTED: ICD-10-PCS | Mod: CPTII,S$GLB,, | Performed by: FAMILY MEDICINE

## 2022-10-10 PROCEDURE — 99214 OFFICE O/P EST MOD 30 MIN: CPT | Mod: 25,S$GLB,, | Performed by: FAMILY MEDICINE

## 2022-10-10 PROCEDURE — 3008F BODY MASS INDEX DOCD: CPT | Mod: CPTII,S$GLB,, | Performed by: FAMILY MEDICINE

## 2022-10-10 PROCEDURE — 90471 IMMUNIZATION ADMIN: CPT | Mod: S$GLB,,, | Performed by: FAMILY MEDICINE

## 2022-10-10 PROCEDURE — 1159F PR MEDICATION LIST DOCUMENTED IN MEDICAL RECORD: ICD-10-PCS | Mod: CPTII,S$GLB,, | Performed by: FAMILY MEDICINE

## 2022-10-10 PROCEDURE — 3080F DIAST BP >= 90 MM HG: CPT | Mod: CPTII,S$GLB,, | Performed by: FAMILY MEDICINE

## 2022-10-10 PROCEDURE — 3008F PR BODY MASS INDEX (BMI) DOCUMENTED: ICD-10-PCS | Mod: CPTII,S$GLB,, | Performed by: FAMILY MEDICINE

## 2022-10-10 PROCEDURE — 1159F MED LIST DOCD IN RCRD: CPT | Mod: CPTII,S$GLB,, | Performed by: FAMILY MEDICINE

## 2022-10-10 PROCEDURE — 3080F PR MOST RECENT DIASTOLIC BLOOD PRESSURE >= 90 MM HG: ICD-10-PCS | Mod: CPTII,S$GLB,, | Performed by: FAMILY MEDICINE

## 2022-10-10 PROCEDURE — 90682 RIV4 VACC RECOMBINANT DNA IM: CPT | Mod: S$GLB,,, | Performed by: FAMILY MEDICINE

## 2022-10-10 PROCEDURE — 99214 PR OFFICE/OUTPT VISIT, EST, LEVL IV, 30-39 MIN: ICD-10-PCS | Mod: 25,S$GLB,, | Performed by: FAMILY MEDICINE

## 2022-10-10 PROCEDURE — 90682 FLU VACCINE - QUADRIVALENT (RECOMBINANT) PRESERVATIVE FREE: ICD-10-PCS | Mod: S$GLB,,, | Performed by: FAMILY MEDICINE

## 2022-10-10 RX ORDER — OLMESARTAN MEDOXOMIL AND HYDROCHLOROTHIAZIDE 40/12.5 40; 12.5 MG/1; MG/1
1 TABLET ORAL DAILY
Qty: 90 TABLET | Refills: 3 | Status: SHIPPED | OUTPATIENT
Start: 2022-10-10 | End: 2023-06-20 | Stop reason: SDUPTHER

## 2022-10-10 NOTE — PROGRESS NOTES
SUBJECTIVE:    Patient ID: Eleazar Dickson is a 52 y.o. female.    Chief Complaint: Regular Check Up; concerns of blood clots in L leg/tingling in L leg; ears bothersome, specifically L ear; flu vaccine today; and fall in shower / request knee MRI    Patient with MS had some progressive symptoms and was seen by neurosurgery. Had ACDF in [facility] with some benefit of pain. Still has some weakness and gait issues. She is to resume physical therapy. . This has been interrupted due to her job.   Reports a fall in the shower 3 weeks ago . Not has some pain and instability in her knee.   Notes has varicose veins and has been having some tingliing and discomfort in her legs  Has notes some superficial clotting   Also notes some trouble with her ear for the past month. Notes pain and decreased hearing. No drainage. Reports started after COVID   Patient has not been on her BP medications for several months       Past Medical History:   Diagnosis Date    Anemia     Hypertension     Lumbar pain     Restless leg syndrome      Past Surgical History:   Procedure Laterality Date     SECTION      DECOMPRESSION OF CERVICAL SPINE BY ANTERIOR APPROACH WITH FUSION  2022    dilatation & curretage      HYSTERECTOMY  2017     Family History   Problem Relation Age of Onset    Cancer Maternal Grandmother     Colon cancer Maternal Grandmother     Breast cancer Neg Hx     Ovarian cancer Neg Hx        Marital Status:   Alcohol History:  reports current alcohol use.  Tobacco History:  reports that she has quit smoking. She has never used smokeless tobacco.  Drug History:  reports no history of drug use.    Review of patient's allergies indicates:   Allergen Reactions    Adhesive Dermatitis       Current Outpatient Medications:     azelastine (ASTELIN) 137 mcg (0.1 %) nasal spray, 1 spray (137 mcg total) by Nasal route 2 (two) times daily., Disp: 30 mL, Rfl: 0    baclofen (LIORESAL) 10 MG tablet, Take 10 mg by mouth  "once daily at 6am., Disp: , Rfl:     multivitamin (THERAGRAN) per tablet, Take 1 tablet by mouth once daily., Disp: , Rfl:     GLATIRAMER ACETATE (COPAXONE 40MG/ML), Inject 1 Syringe into the skin 3 (three) times a week., Disp: , Rfl:     methyl salicylate-menthol 10-3 % PtMd, Apply topically., Disp: , Rfl:     olmesartan-hydrochlorothiazide (BENICAR HCT) 40-12.5 mg Tab, Take 1 tablet by mouth once daily., Disp: 90 tablet, Rfl: 3    pregabalin (LYRICA) 100 MG capsule, Take by mouth 3 (three) times daily. , Disp: , Rfl:     pulse oximeter (PULSE OXIMETER) device, by Apply Externally route 2 (two) times a day. Use twice daily at 8 AM and 3 PM and record the value in [email or web address] as directed., Disp: 1 each, Rfl: 0    valACYclovir (VALTREX) 500 MG tablet, Take 1 tablet (500 mg total) by mouth 2 (two) times daily. for 5 days, Disp: 10 tablet, Rfl: 0    vitamin D 1000 units Tab, Take 1,000 Units by mouth once daily., Disp: , Rfl:     Review of Systems   All other systems reviewed and are negative.       Objective:      Vitals:    10/10/22 1045   BP: (!) 150/100   Pulse: 71   SpO2: 98%   Weight: 108.4 kg (239 lb)   Height: 5' 9" (1.753 m)     Physical Exam  Constitutional:       Appearance: Normal appearance.   HENT:      Head: Normocephalic and atraumatic.      Right Ear: Tympanic membrane is retracted.      Left Ear: Tympanic membrane is retracted.      Mouth/Throat:      Mouth: Mucous membranes are moist.   Eyes:      Conjunctiva/sclera: Conjunctivae normal.   Cardiovascular:      Comments: Varicose veins   Pulmonary:      Effort: Pulmonary effort is normal.   Musculoskeletal:      Cervical back: Decreased range of motion.      Left knee: Decreased range of motion. Tenderness present.   Neurological:      General: No focal deficit present.      Mental Status: She is alert and oriented to person, place, and time.   Psychiatric:         Mood and Affect: Mood normal.         Behavior: Behavior normal.         Assessment:     "   1. Essential hypertension    2. Sprain of left knee, unspecified ligament, initial encounter    3. Left leg swelling    4. Otalgia, unspecified laterality    5. Iron deficiency anemia secondary to inadequate dietary iron intake    6. Thyroid nodule    7. Multiple sclerosis    8. Needs flu shot    9. Encounter for screening mammogram for malignant neoplasm of breast    10. Colon cancer screening    11. Family history of colon cancer         Plan:       Essential hypertension  -     olmesartan-hydrochlorothiazide (BENICAR HCT) 40-12.5 mg Tab; Take 1 tablet by mouth once daily.  Dispense: 90 tablet; Refill: 3  -     Comprehensive Metabolic Panel; Future; Expected date: 10/10/2022  -     Microalbumin/Creatinine Ratio, Urine; Future; Expected date: 10/10/2022    Sprain of left knee, unspecified ligament, initial encounter  -     X-Ray Knee 3 View Left; Future; Expected date: 10/10/2022    Left leg swelling  -     US Lower Extremity Veins Left; Future; Expected date: 10/10/2022    Otalgia, unspecified laterality    Iron deficiency anemia secondary to inadequate dietary iron intake  -     CBC Auto Differential; Future; Expected date: 10/10/2022  -     Iron; Future; Expected date: 10/10/2022    Thyroid nodule  -     TSH w/reflex to FT4; Future; Expected date: 10/10/2022    Multiple sclerosis    Needs flu shot  -     Influenza - Quadrivalent (Recombinant) (PF)    Encounter for screening mammogram for malignant neoplasm of breast  -     Mammo Digital Screening Bilat w/ Ruben; Future; Expected date: 10/10/2022    Colon cancer screening  -     Case Request Endoscopy: COLONOSCOPY    Family history of colon cancer  -     Case Request Endoscopy: COLONOSCOPY    Follow up in about 2 months (around 12/10/2022) for HTN.

## 2022-10-11 ENCOUNTER — PATIENT MESSAGE (OUTPATIENT)
Dept: GASTROENTEROLOGY | Facility: CLINIC | Age: 52
End: 2022-10-11
Payer: COMMERCIAL

## 2022-10-11 LAB
ALBUMIN SERPL-MCNC: 4.6 G/DL (ref 3.6–5.1)
ALBUMIN/CREAT UR: 4 MCG/MG CREAT
ALBUMIN/GLOB SERPL: 2 (CALC) (ref 1–2.5)
ALP SERPL-CCNC: 82 U/L (ref 37–153)
ALT SERPL-CCNC: 19 U/L (ref 6–29)
AST SERPL-CCNC: 14 U/L (ref 10–35)
BASOPHILS # BLD AUTO: 22 CELLS/UL (ref 0–200)
BASOPHILS NFR BLD AUTO: 0.5 %
BILIRUB SERPL-MCNC: 0.5 MG/DL (ref 0.2–1.2)
BUN SERPL-MCNC: 10 MG/DL (ref 7–25)
BUN/CREAT SERPL: ABNORMAL (CALC) (ref 6–22)
CALCIUM SERPL-MCNC: 10.7 MG/DL (ref 8.6–10.4)
CHLORIDE SERPL-SCNC: 108 MMOL/L (ref 98–110)
CO2 SERPL-SCNC: 29 MMOL/L (ref 20–32)
CREAT SERPL-MCNC: 0.82 MG/DL (ref 0.5–1.03)
CREAT UR-MCNC: 174 MG/DL (ref 20–275)
EGFR: 87 ML/MIN/1.73M2
EOSINOPHIL # BLD AUTO: 48 CELLS/UL (ref 15–500)
EOSINOPHIL NFR BLD AUTO: 1.1 %
ERYTHROCYTE [DISTWIDTH] IN BLOOD BY AUTOMATED COUNT: 12.2 % (ref 11–15)
GLOBULIN SER CALC-MCNC: 2.3 G/DL (CALC) (ref 1.9–3.7)
GLUCOSE SERPL-MCNC: 84 MG/DL (ref 65–99)
HCT VFR BLD AUTO: 38.7 % (ref 35–45)
HGB BLD-MCNC: 12.9 G/DL (ref 11.7–15.5)
IRON SERPL-MCNC: 70 MCG/DL (ref 45–160)
LYMPHOCYTES # BLD AUTO: 1456 CELLS/UL (ref 850–3900)
LYMPHOCYTES NFR BLD AUTO: 33.1 %
MCH RBC QN AUTO: 30.3 PG (ref 27–33)
MCHC RBC AUTO-ENTMCNC: 33.3 G/DL (ref 32–36)
MCV RBC AUTO: 90.8 FL (ref 80–100)
MICROALBUMIN UR-MCNC: 0.7 MG/DL
MONOCYTES # BLD AUTO: 387 CELLS/UL (ref 200–950)
MONOCYTES NFR BLD AUTO: 8.8 %
NEUTROPHILS # BLD AUTO: 2486 CELLS/UL (ref 1500–7800)
NEUTROPHILS NFR BLD AUTO: 56.5 %
PLATELET # BLD AUTO: 281 THOUSAND/UL (ref 140–400)
PMV BLD REES-ECKER: 10.1 FL (ref 7.5–12.5)
POTASSIUM SERPL-SCNC: 4.5 MMOL/L (ref 3.5–5.3)
PROT SERPL-MCNC: 6.9 G/DL (ref 6.1–8.1)
RBC # BLD AUTO: 4.26 MILLION/UL (ref 3.8–5.1)
SODIUM SERPL-SCNC: 141 MMOL/L (ref 135–146)
TSH SERPL-ACNC: 1.26 MIU/L
WBC # BLD AUTO: 4.4 THOUSAND/UL (ref 3.8–10.8)

## 2022-10-14 ENCOUNTER — TELEPHONE (OUTPATIENT)
Dept: GASTROENTEROLOGY | Facility: CLINIC | Age: 52
End: 2022-10-14
Payer: COMMERCIAL

## 2022-10-14 ENCOUNTER — PATIENT MESSAGE (OUTPATIENT)
Dept: GASTROENTEROLOGY | Facility: CLINIC | Age: 52
End: 2022-10-14
Payer: COMMERCIAL

## 2022-10-14 NOTE — TELEPHONE ENCOUNTER
Reached out to patient to return phone call. No answer. Voicemail left to return call to clinic.

## 2022-10-14 NOTE — TELEPHONE ENCOUNTER
----- Message from David Galvez sent at 10/14/2022  8:55 AM CDT -----  Regarding: pt called  Name of Who is Calling: TARIK ESPARZA [[id number]]      What is the request in detail: pt called requesting a call back to schedule her colonoscopy.Please advise      Can the clinic reply by [facility]: No      What Number to Call Back if not in [facility]:[phone] (home)

## 2022-10-21 ENCOUNTER — HOSPITAL ENCOUNTER (OUTPATIENT)
Dept: RADIOLOGY | Facility: HOSPITAL | Age: 52
Discharge: HOME | End: 2022-10-21
Attending: FAMILY MEDICINE
Payer: COMMERCIAL

## 2022-10-21 DIAGNOSIS — M79.89 LEFT LEG SWELLING: ICD-10-CM

## 2022-10-21 DIAGNOSIS — Z12.31 ENCOUNTER FOR SCREENING MAMMOGRAM FOR MALIGNANT NEOPLASM OF BREAST: ICD-10-CM

## 2022-10-21 DIAGNOSIS — S83.92XA SPRAIN OF LEFT KNEE, UNSPECIFIED LIGAMENT, INITIAL ENCOUNTER: ICD-10-CM

## 2022-10-21 PROCEDURE — 77063 BREAST TOMOSYNTHESIS BI: CPT | Mod: TC,PO

## 2022-10-21 PROCEDURE — 77067 SCR MAMMO BI INCL CAD: CPT | Mod: TC,PO

## 2022-10-21 PROCEDURE — 93971 EXTREMITY STUDY: CPT | Mod: TC,PO,LT

## 2022-10-21 PROCEDURE — 73564 X-RAY EXAM KNEE 4 OR MORE: CPT | Mod: TC,PO,LT

## 2022-10-27 ENCOUNTER — HOSPITAL ENCOUNTER (OUTPATIENT)
Dept: RADIOLOGY | Facility: HOSPITAL | Age: 52
Discharge: HOME | End: 2022-10-27
Attending: FAMILY MEDICINE
Payer: COMMERCIAL

## 2022-10-27 DIAGNOSIS — R92.2 INCONCLUSIVE MAMMOGRAM: ICD-10-CM

## 2022-10-27 PROCEDURE — 76642 ULTRASOUND BREAST LIMITED: CPT | Mod: TC,PO,RT

## 2022-11-08 ENCOUNTER — TELEPHONE (OUTPATIENT)
Dept: FAMILY MEDICINE | Facility: CLINIC | Age: 52
End: 2022-11-08

## 2022-11-08 NOTE — TELEPHONE ENCOUNTER
----- Message from Vibha Taylor sent at 11/8/2022  3:05 PM CST -----  Patient called and stated that she need to speak to the doctor about a possible bladder infection (the patient would not give any other details ) please give her a call at [phone]

## 2022-11-14 ENCOUNTER — CLINICAL SUPPORT (OUTPATIENT)
Dept: FAMILY MEDICINE | Facility: CLINIC | Age: 52
End: 2022-11-14
Payer: COMMERCIAL

## 2022-11-14 VITALS — DIASTOLIC BLOOD PRESSURE: 76 MMHG | SYSTOLIC BLOOD PRESSURE: 124 MMHG

## 2022-11-14 DIAGNOSIS — R30.0 DYSURIA: Primary | ICD-10-CM

## 2022-11-16 ENCOUNTER — PATIENT MESSAGE (OUTPATIENT)
Dept: FAMILY MEDICINE | Facility: CLINIC | Age: 52
End: 2022-11-16

## 2022-11-16 LAB
APPEARANCE UR: ABNORMAL
BACTERIA #/AREA URNS HPF: ABNORMAL /HPF
BACTERIA UR CULT: ABNORMAL
BACTERIA UR CULT: ABNORMAL
BILIRUB UR QL STRIP: NEGATIVE
COLOR UR: YELLOW
GLUCOSE UR QL STRIP: NEGATIVE
HGB UR QL STRIP: ABNORMAL
HYALINE CASTS #/AREA URNS LPF: ABNORMAL /LPF
KETONES UR QL STRIP: NEGATIVE
LEUKOCYTE ESTERASE UR QL STRIP: ABNORMAL
NITRITE UR QL STRIP: NEGATIVE
PH UR STRIP: 5.5 [PH] (ref 5–8)
PROT UR QL STRIP: ABNORMAL
RBC #/AREA URNS HPF: ABNORMAL /HPF
SERVICE CMNT-IMP: ABNORMAL
SP GR UR STRIP: 1.01 (ref 1–1.03)
SQUAMOUS #/AREA URNS HPF: ABNORMAL /HPF
WBC #/AREA URNS HPF: ABNORMAL /HPF

## 2022-11-16 RX ORDER — FLUCONAZOLE 150 MG/1
150 TABLET ORAL DAILY
Qty: 1 TABLET | Refills: 0 | Status: SHIPPED | OUTPATIENT
Start: 2022-11-16 | End: 2022-11-17

## 2022-11-16 RX ORDER — CIPROFLOXACIN 250 MG/1
250 TABLET, FILM COATED ORAL EVERY 12 HOURS
Qty: 10 TABLET | Refills: 0 | Status: SHIPPED | OUTPATIENT
Start: 2022-11-16 | End: 2022-11-21

## 2022-11-16 NOTE — TELEPHONE ENCOUNTER
prescription sent to   [facility]/pharmacy #[id number] - [place] - [address]  [address] [place] [address]  Phone: [phone] Fax: [phone]

## 2022-11-16 NOTE — TELEPHONE ENCOUNTER
Cipro pended per dr taveras.  Patient notified, verbalized understanding.  To MD to send rx to pharmacy -DN

## 2022-11-16 NOTE — TELEPHONE ENCOUNTER
prescription sent to   [facility]/pharmacy #[id number] - [place] - [address]  [address] [place] [address]  Phone: [phone] Fax: [phone]

## 2022-11-16 NOTE — TELEPHONE ENCOUNTER
----- Message from Vibha Taylor sent at 11/16/2022 12:10 PM CST -----  Patient called and stated that she had labs done and she is waiting  for her lab results please give her a call [phone]

## 2022-11-17 ENCOUNTER — TELEPHONE (OUTPATIENT)
Dept: FAMILY MEDICINE | Facility: CLINIC | Age: 52
End: 2022-11-17

## 2022-11-17 NOTE — TELEPHONE ENCOUNTER
----- Message from [facility], RT sent at 11/17/2022  8:41 AM CST -----  E Coli UTI

## 2022-12-14 ENCOUNTER — HOSPITAL ENCOUNTER (OUTPATIENT)
Facility: HOSPITAL | Age: 52
Discharge: HOME | End: 2022-12-14
Attending: INTERNAL MEDICINE | Admitting: INTERNAL MEDICINE
Payer: COMMERCIAL

## 2022-12-14 ENCOUNTER — ANESTHESIA (OUTPATIENT)
Dept: ENDOSCOPY | Facility: HOSPITAL | Age: 52
End: 2022-12-14
Payer: COMMERCIAL

## 2022-12-14 ENCOUNTER — ANESTHESIA EVENT (OUTPATIENT)
Dept: ENDOSCOPY | Facility: HOSPITAL | Age: 52
End: 2022-12-14
Payer: COMMERCIAL

## 2022-12-14 VITALS
HEART RATE: 64 BPM | WEIGHT: 225 LBS | SYSTOLIC BLOOD PRESSURE: 113 MMHG | TEMPERATURE: 98 F | OXYGEN SATURATION: 99 % | HEIGHT: 69 IN | DIASTOLIC BLOOD PRESSURE: 62 MMHG | RESPIRATION RATE: 18 BRPM | BODY MASS INDEX: 33.33 KG/M2

## 2022-12-14 DIAGNOSIS — Z12.11 COLON CANCER SCREENING: ICD-10-CM

## 2022-12-14 PROCEDURE — D9220A PRA ANESTHESIA: Mod: CRNA,,, | Performed by: NURSE ANESTHETIST, CERTIFIED REGISTERED

## 2022-12-14 PROCEDURE — G0105 COLORECTAL SCRN; HI RISK IND: HCPCS | Mod: ,,, | Performed by: INTERNAL MEDICINE

## 2022-12-14 PROCEDURE — D9220A PRA ANESTHESIA: ICD-10-PCS | Mod: CRNA,,, | Performed by: NURSE ANESTHETIST, CERTIFIED REGISTERED

## 2022-12-14 PROCEDURE — 25000003 PHARM REV CODE 250: Performed by: INTERNAL MEDICINE

## 2022-12-14 PROCEDURE — 63600175 PHARM REV CODE 636 W HCPCS: Performed by: NURSE ANESTHETIST, CERTIFIED REGISTERED

## 2022-12-14 PROCEDURE — 25000003 PHARM REV CODE 250: Performed by: NURSE ANESTHETIST, CERTIFIED REGISTERED

## 2022-12-14 PROCEDURE — 37000008 HC ANESTHESIA 1ST 15 MINUTES: Performed by: INTERNAL MEDICINE

## 2022-12-14 PROCEDURE — D9220A PRA ANESTHESIA: ICD-10-PCS | Mod: ANES,,, | Performed by: ANESTHESIOLOGY

## 2022-12-14 PROCEDURE — 37000009 HC ANESTHESIA EA ADD 15 MINS: Performed by: INTERNAL MEDICINE

## 2022-12-14 PROCEDURE — D9220A PRA ANESTHESIA: Mod: ANES,,, | Performed by: ANESTHESIOLOGY

## 2022-12-14 PROCEDURE — G0105 COLORECTAL SCRN; HI RISK IND: HCPCS | Performed by: INTERNAL MEDICINE

## 2022-12-14 PROCEDURE — G0105 COLORECTAL SCRN; HI RISK IND: ICD-10-PCS | Mod: ,,, | Performed by: INTERNAL MEDICINE

## 2022-12-14 RX ORDER — SODIUM CHLORIDE 9 MG/ML
INJECTION, SOLUTION INTRAVENOUS CONTINUOUS
Status: DISCONTINUED | OUTPATIENT
Start: 2022-12-14 | End: 2022-12-14 | Stop reason: HOSPADM

## 2022-12-14 RX ORDER — LIDOCAINE HCL/PF 100 MG/5ML
SYRINGE (ML) INTRAVENOUS
Status: DISCONTINUED | OUTPATIENT
Start: 2022-12-14 | End: 2022-12-14

## 2022-12-14 RX ORDER — PROPOFOL 10 MG/ML
VIAL (ML) INTRAVENOUS
Status: DISCONTINUED | OUTPATIENT
Start: 2022-12-14 | End: 2022-12-14

## 2022-12-14 RX ADMIN — PROPOFOL 30 MG: 10 INJECTION, EMULSION INTRAVENOUS at 10:12

## 2022-12-14 RX ADMIN — PROPOFOL 30 MG: 10 INJECTION, EMULSION INTRAVENOUS at 09:12

## 2022-12-14 RX ADMIN — LIDOCAINE HYDROCHLORIDE 100 MG: 20 INJECTION INTRAVENOUS at 09:12

## 2022-12-14 RX ADMIN — SODIUM CHLORIDE: 9 INJECTION, SOLUTION INTRAVENOUS at 09:12

## 2022-12-14 RX ADMIN — PROPOFOL 50 MG: 10 INJECTION, EMULSION INTRAVENOUS at 09:12

## 2022-12-14 NOTE — ANESTHESIA PREPROCEDURE EVALUATION
12/14/2022  Eleazar Dickson is a 52 y.o., female.      Pre-op Assessment    I have reviewed the Patient Summary Reports.     I have reviewed the Nursing Notes. I have reviewed the NPO Status.   I have reviewed the Medications.     Review of Systems  Anesthesia Hx:  No problems with previous Anesthesia    Social:  Former Smoker    Hematology/Oncology:  Hematology Normal   Oncology Normal     EENT/Dental:EENT/Dental Normal   Cardiovascular:   Hypertension    Pulmonary:  Pulmonary Normal    Renal/:  Renal/ Normal     Hepatic/GI:  Hepatic/GI Normal Bowel Prep.    Musculoskeletal:  Musculoskeletal Normal    Neurological:   Neuromuscular Disease,    Endocrine:  Obesity / BMI > 30  Dermatological:  Skin Normal    Psych:  Psychiatric Normal           Physical Exam  General: Cooperative, Alert and Oriented    Airway:  Mallampati: II   Mouth Opening: Normal  TM Distance: Normal  Neck ROM: Normal ROM    Dental:  Intact        Anesthesia Plan  Type of Anesthesia, risks & benefits discussed:    Anesthesia Type: Gen Natural Airway  Intra-op Monitoring Plan: Standard ASA Monitors  Induction:  IV  Informed Consent: Informed consent signed with the Patient and all parties understand the risks and agree with anesthesia plan.  All questions answered.   ASA Score: 3  Day of Surgery Review of History & Physical: H&P Update referred to the surgeon/provider.    Ready For Surgery From Anesthesia Perspective.     .

## 2022-12-14 NOTE — H&P
[facility] Note    CC: Rectal bleeding    HPI 52 y.o. female presents for evaluation of rectal bleeding    Past Medical History:   Diagnosis Date    Anemia     Hypertension     Lumbar pain     Restless leg syndrome        Allergies and Medications reviewed     Review of Systems  General ROS: negative for - chills, fever or weight loss  Cardiovascular ROS: no chest pain or dyspnea on exertion  Gastrointestinal ROS: + rectal bleeding    Physical Examination  LMP 06/21/2017 (Exact Date)   Breastfeeding No   General appearance: alert, cooperative, no distress  HENT: Normocephalic, atraumatic, neck symmetrical, no nasal discharge, sclera anicteric   Lungs: clear to auscultation bilaterally, symmetric chest wall expansion bilaterally  Heart: regular rate and rhythm without rub; no displacement of the PMI   Abdomen: soft  Extremities: extremities symmetric; no clubbing, cyanosis, or edema        Labs:  Lab Results   Component Value Date    WBC 4.4 10/10/2022    HGB 12.9 10/10/2022    HCT 38.7 10/10/2022    MCV 90.8 10/10/2022     10/10/2022     Assessment:   52 y.o. female presents for evaluation of rectal bleeding    Plan:  -Proceed to colonoscopy     Luna Borrero MD  [facility]  [address], [address]  [place] [address]  Office: [phone]  Fax: [phone]

## 2022-12-14 NOTE — TRANSFER OF CARE
"Anesthesia Transfer of Care Note    Patient: Eleazar Dickson    Procedure(s) Performed: Procedure(s) (LRB):  COLONOSCOPY (N/A)    Patient location: PACU    Anesthesia Type: general    Transport from OR: Transported from OR on room air with adequate spontaneous ventilation    Post pain: adequate analgesia    Post assessment: no apparent anesthetic complications    Post vital signs: stable    Level of consciousness: responds to stimulation    Nausea/Vomiting: no nausea/vomiting    Complications: none    Transfer of care protocol was followed      Last vitals:   Visit Vitals  BP (!) 123/58 (BP Location: Left arm, Patient Position: Lying)   Pulse 69   Temp 36.4 °C (97.5 °F) (Skin)   Resp 16   Ht 5' 9" (1.753 m)   Wt 102.1 kg (225 lb)   LMP 06/21/2017 (Exact Date)   SpO2 99%   Breastfeeding No   BMI 33.23 kg/m²     "

## 2022-12-14 NOTE — ANESTHESIA POSTPROCEDURE EVALUATION
Anesthesia Post Evaluation    Patient: Eleazar Dickson    Procedure(s) Performed: Procedure(s) (LRB):  COLONOSCOPY (N/A)    Final Anesthesia Type: general      Patient location during evaluation: PACU  Patient participation: Yes- Able to Participate  Level of consciousness: awake and alert  Post-procedure vital signs: reviewed and stable  Pain management: adequate  Airway patency: patent    PONV status at discharge: No PONV  Anesthetic complications: no      Cardiovascular status: hemodynamically stable  Respiratory status: unassisted and room air  Hydration status: euvolemic  Follow-up not needed.          Vitals Value Taken Time   /62 12/14/22 1039     12/14/22 1302   Pulse 64 12/14/22 1039   Resp 18 12/14/22 1039   SpO2 99 % 12/14/22 1039         Event Time   Out of Recovery 11:10:43         Pain/Donald Score: Donald Score: 10 (12/14/2022 10:20 AM)

## 2022-12-14 NOTE — PROVATION PATIENT INSTRUCTIONS
Discharge Summary/Instructions after an Endoscopic Procedure  Patient Name: Eleazar Dickson  Patient MRN: [id number]  Patient YOB: 1970 Wednesday, December 14, 2022  Luna Borrero MD  Dear patient,  As a result of recent federal legislation (The Federal Cures Act), you may   receive lab or pathology results from your procedure in your [facility]   account before your physician is able to contact you. Your physician or   their representative will relay the results to you with their   recommendations at their soonest availability.  Thank you,  RESTRICTIONS:  During your procedure today, you received medications for sedation.  These   medications may affect your judgment, balance and coordination.  Therefore,   for 24 hours, you have the following restrictions:   - DO NOT drive a car, operate machinery, make legal/financial decisions,   sign important papers or drink alcohol.    ACTIVITY:  Today: no heavy lifting, straining or running due to procedural   sedation/anesthesia.  The following day: return to full activity including work.  DIET:  Eat and drink normally unless instructed otherwise.     TREATMENT FOR COMMON SIDE EFFECTS:  - Mild abdominal pain, nausea, belching, bloating or excessive gas:  rest,   eat lightly and use a heating pad.  - Sore Throat: treat with throat lozenges and/or gargle with warm salt   water.  - Because air was used during the procedure, expelling large amounts of air   from your rectum or belching is normal.  - If a bowel prep was taken, you may not have a bowel movement for 1-3 days.    This is normal.  SYMPTOMS TO WATCH FOR AND REPORT TO YOUR PHYSICIAN:  1. Abdominal pain or bloating, other than gas cramps.  2. Chest pain.  3. Back pain.  4. Signs of infection such as: chills or fever occurring within 24 hours   after the procedure.  5. Rectal bleeding, which would show as bright red, maroon, or black stools.   (A tablespoon of blood from the rectum is not serious,  especially if   hemorrhoids are present.)  6. Vomiting.  7. Weakness or dizziness.  GO DIRECTLY TO THE NEAREST EMERGENCY ROOM IF YOU HAVE ANY OF THE FOLLOWING:      Difficulty breathing              Chills and/or fever over 101 F   Persistent vomiting and/or vomiting blood   Severe abdominal pain   Severe chest pain   Black, tarry stools   Bleeding- more than one tablespoon   Any other symptom or condition that you feel may need urgent attention  Your doctor recommends these additional instructions:  If any biopsies were taken, your doctors clinic will contact you in 1 to 2   weeks with any results.  - Discharge patient to home (with escort).   - Patient has a contact number available for emergencies.  The signs and   symptoms of potential delayed complications were discussed with the   patient.  Return to normal activities tomorrow.  Written discharge   instructions were provided to the patient.   - Resume previous diet.   - Continue present medications.   - Repeat colonoscopy in 10 years for screening purposes.   - Return to my office PRN.  For questions, problems or results please call your physician - Luna Borrero MD at Work:  [phone].  [facility], EMERGENCY ROOM PHONE NUMBER: [phone]  IF A COMPLICATION OR EMERGENCY SITUATION ARISES AND YOU ARE UNABLE TO REACH   YOUR PHYSICIAN - GO DIRECTLY TO THE EMERGENCY ROOM.  Luna Borrero MD  12/14/2022 10:16:56 AM  This report has been verified and signed electronically.  Dear patient,  As a result of recent federal legislation (The Federal Cures Act), you may   receive lab or pathology results from your procedure in your [facility]   account before your physician is able to contact you. Your physician or   their representative will relay the results to you with their   recommendations at their soonest availability.  Thank you,  PROVATION

## 2022-12-21 ENCOUNTER — OFFICE VISIT (OUTPATIENT)
Dept: FAMILY MEDICINE | Facility: CLINIC | Age: 52
End: 2022-12-21
Payer: COMMERCIAL

## 2022-12-21 VITALS
BODY MASS INDEX: 32.44 KG/M2 | HEIGHT: 69 IN | SYSTOLIC BLOOD PRESSURE: 114 MMHG | DIASTOLIC BLOOD PRESSURE: 70 MMHG | WEIGHT: 219 LBS | OXYGEN SATURATION: 99 % | HEART RATE: 61 BPM

## 2022-12-21 DIAGNOSIS — Z23 NEED FOR PNEUMOCOCCAL VACCINATION: ICD-10-CM

## 2022-12-21 DIAGNOSIS — I10 ESSENTIAL HYPERTENSION: ICD-10-CM

## 2022-12-21 DIAGNOSIS — Z00.00 ANNUAL PHYSICAL EXAM: Primary | ICD-10-CM

## 2022-12-21 DIAGNOSIS — G35 MULTIPLE SCLEROSIS: ICD-10-CM

## 2022-12-21 PROCEDURE — 3074F PR MOST RECENT SYSTOLIC BLOOD PRESSURE < 130 MM HG: ICD-10-PCS | Mod: CPTII,S$GLB,, | Performed by: FAMILY MEDICINE

## 2022-12-21 PROCEDURE — 3061F PR NEG MICROALBUMINURIA RESULT DOCUMENTED/REVIEW: ICD-10-PCS | Mod: CPTII,S$GLB,, | Performed by: FAMILY MEDICINE

## 2022-12-21 PROCEDURE — 3066F NEPHROPATHY DOC TX: CPT | Mod: CPTII,S$GLB,, | Performed by: FAMILY MEDICINE

## 2022-12-21 PROCEDURE — 90471 PNEUMOCOCCAL CONJUGATE VACCINE 20-VALENT: ICD-10-PCS | Mod: S$GLB,,, | Performed by: FAMILY MEDICINE

## 2022-12-21 PROCEDURE — 3061F NEG MICROALBUMINURIA REV: CPT | Mod: CPTII,S$GLB,, | Performed by: FAMILY MEDICINE

## 2022-12-21 PROCEDURE — 90471 IMMUNIZATION ADMIN: CPT | Mod: S$GLB,,, | Performed by: FAMILY MEDICINE

## 2022-12-21 PROCEDURE — 3008F PR BODY MASS INDEX (BMI) DOCUMENTED: ICD-10-PCS | Mod: CPTII,S$GLB,, | Performed by: FAMILY MEDICINE

## 2022-12-21 PROCEDURE — 90677 PNEUMOCOCCAL CONJUGATE VACCINE 20-VALENT: ICD-10-PCS | Mod: S$GLB,,, | Performed by: FAMILY MEDICINE

## 2022-12-21 PROCEDURE — 3078F DIAST BP <80 MM HG: CPT | Mod: CPTII,S$GLB,, | Performed by: FAMILY MEDICINE

## 2022-12-21 PROCEDURE — 99396 PREV VISIT EST AGE 40-64: CPT | Mod: 25,S$GLB,, | Performed by: FAMILY MEDICINE

## 2022-12-21 PROCEDURE — 99396 PR PREVENTIVE VISIT,EST,40-64: ICD-10-PCS | Mod: 25,S$GLB,, | Performed by: FAMILY MEDICINE

## 2022-12-21 PROCEDURE — 3074F SYST BP LT 130 MM HG: CPT | Mod: CPTII,S$GLB,, | Performed by: FAMILY MEDICINE

## 2022-12-21 PROCEDURE — 1159F PR MEDICATION LIST DOCUMENTED IN MEDICAL RECORD: ICD-10-PCS | Mod: CPTII,S$GLB,, | Performed by: FAMILY MEDICINE

## 2022-12-21 PROCEDURE — 1160F RVW MEDS BY RX/DR IN RCRD: CPT | Mod: CPTII,S$GLB,, | Performed by: FAMILY MEDICINE

## 2022-12-21 PROCEDURE — 3008F BODY MASS INDEX DOCD: CPT | Mod: CPTII,S$GLB,, | Performed by: FAMILY MEDICINE

## 2022-12-21 PROCEDURE — 1160F PR REVIEW ALL MEDS BY PRESCRIBER/CLIN PHARMACIST DOCUMENTED: ICD-10-PCS | Mod: CPTII,S$GLB,, | Performed by: FAMILY MEDICINE

## 2022-12-21 PROCEDURE — 90677 PCV20 VACCINE IM: CPT | Mod: S$GLB,,, | Performed by: FAMILY MEDICINE

## 2022-12-21 PROCEDURE — 3078F PR MOST RECENT DIASTOLIC BLOOD PRESSURE < 80 MM HG: ICD-10-PCS | Mod: CPTII,S$GLB,, | Performed by: FAMILY MEDICINE

## 2022-12-21 PROCEDURE — 3066F PR DOCUMENTATION OF TREATMENT FOR NEPHROPATHY: ICD-10-PCS | Mod: CPTII,S$GLB,, | Performed by: FAMILY MEDICINE

## 2022-12-21 PROCEDURE — 1159F MED LIST DOCD IN RCRD: CPT | Mod: CPTII,S$GLB,, | Performed by: FAMILY MEDICINE

## 2022-12-21 NOTE — PROGRESS NOTES
SUBJECTIVE:   HPI: Eleazar Dickson  is a 52 y.o. female who presents for annual physical .   HTN Check Up    Patient hypertension presents for her annual examination.  She is taking her blood pressure medications without any difficulty in her blood pressure is well controlled labs performed show a normal CBC and iron level.  She did have a history of iron deficiency anemia secondary to chronic blood loss.  She has required iron infusions in the past.  However the acute condition appears to have resolved after having her hysterectomy.  She has completed her COVID vaccination series including a booster.  She is up-to-date with tetanus.  Lipid panel and thyroid are both normal.  She has also received her flu vaccination.  Mammogram and colonoscopy are both normal.  Patient reporting no acute issues. She has had 50lb weight loss in the past 18 months     Patient diagnosed with MS a few years ago. She has stopped her therapy at this time. Feels that her breast may be contributing to some of her back pain.  Is looking into reduction mammaplasty.        Clinical Support on 11/14/2022   Component Date Value Ref Range Status    Color, UA 11/14/2022 YELLOW  YELLOW Final    Appearance, UA 11/14/2022 CLOUDY (A)  CLEAR Final    Specific [place], UA 11/14/2022 1.012  1.001 - 1.035 Final    pH, UA 11/14/2022 5.5  5.0 - 8.0 Final    Glucose, UA 11/14/2022 NEGATIVE  NEGATIVE Final    Bilirubin, UA 11/14/2022 NEGATIVE  NEGATIVE Final    Ketones, UA 11/14/2022 NEGATIVE  NEGATIVE Final    Occult Blood UA 11/14/2022 2+ (A)  NEGATIVE Final    Protein, UA 11/14/2022 TRACE (A)  NEGATIVE Final    Nitrite, UA 11/14/2022 NEGATIVE  NEGATIVE Final    Leukocytes, UA 11/14/2022 3+ (A)  NEGATIVE Final    WBC Casts, UA 11/14/2022 > OR = 60 (A)  < OR = 5 /HPF Final    RBC Casts, UA 11/14/2022 10-20 (A)  < OR = 2 /HPF Final    Squam Epithel, UA 11/14/2022 NONE SEEN  < OR = 5 /HPF Final    Bacteria, UA 11/14/2022 NONE SEEN  NONE SEEN /HPF  Final    Hyaline Casts, UA 11/14/2022 NONE SEEN  NONE SEEN /LPF Final    Service Cmt: 11/14/2022    Final    Reflexive Urine Culture 11/14/2022    Final    Urine Culture, Routine 11/14/2022  (A)   Final   Office Visit on 10/10/2022   Component Date Value Ref Range Status    Glucose 10/10/2022 84  65 - 99 mg/dL Final    BUN 10/10/2022 10  7 - 25 mg/dL Final    Creatinine 10/10/2022 0.82  0.50 - 1.03 mg/dL Final    eGFR 10/10/2022 87  > OR = 60 mL/min/1.73m2 Final    BUN/Creatinine Ratio 10/10/2022 NOT APPLICABLE  6 - 22 (calc) Final    Sodium 10/10/2022 141  135 - 146 mmol/L Final    Potassium 10/10/2022 4.5  3.5 - 5.3 mmol/L Final    Chloride 10/10/2022 108  98 - 110 mmol/L Final    CO2 10/10/2022 29  20 - 32 mmol/L Final    Calcium 10/10/2022 10.7 (H)  8.6 - 10.4 mg/dL Final    Total Protein 10/10/2022 6.9  6.1 - 8.1 g/dL Final    Albumin 10/10/2022 4.6  3.6 - 5.1 g/dL Final    Globulin, Total 10/10/2022 2.3  1.9 - 3.7 g/dL (calc) Final    Albumin/Globulin Ratio 10/10/2022 2.0  1.0 - 2.5 (calc) Final    Total Bilirubin 10/10/2022 0.5  0.2 - 1.2 mg/dL Final    Alkaline Phosphatase 10/10/2022 82  37 - 153 U/L Final    AST 10/10/2022 14  10 - 35 U/L Final    ALT 10/10/2022 19  6 - 29 U/L Final    Creatinine, Urine 10/10/2022 174  20 - 275 mg/dL Final    Microalb, Ur 10/10/2022 0.7  See Note: mg/dL Final    Microalb/Creat Ratio 10/10/2022 4  <30 mcg/mg creat Final    TSH w/reflex to FT4 10/10/2022 1.26  mIU/L Final    WBC 10/10/2022 4.4  3.8 - 10.8 Thousand/uL Final    RBC 10/10/2022 4.26  3.80 - 5.10 Million/uL Final    Hemoglobin 10/10/2022 12.9  11.7 - 15.5 g/dL Final    Hematocrit 10/10/2022 38.7  35.0 - 45.0 % Final    MCV 10/10/2022 90.8  80.0 - 100.0 fL Final    MCH 10/10/2022 30.3  27.0 - 33.0 pg Final    MCHC 10/10/2022 33.3  32.0 - 36.0 g/dL Final    RDW 10/10/2022 12.2  11.0 - 15.0 % Final    Platelets 10/10/2022 281  140 - 400 Thousand/uL Final    MPV 10/10/2022 10.1  7.5 - 12.5 fL Final    Neutrophils, Abs  10/10/2022 2,486  1,500 - 7,800 cells/uL Final    Lymph # 10/10/2022 1,456  850 - 3,900 cells/uL Final    Mono # 10/10/2022 387  200 - 950 cells/uL Final    Eos # 10/10/2022 48  15 - 500 cells/uL Final    Baso # 10/10/2022 22  0 - 200 cells/uL Final    Neutrophils Relative 10/10/2022 56.5  % Final    Lymph % 10/10/2022 33.1  % Final    Mono % 10/10/2022 8.8  % Final    Eosinophil % 10/10/2022 1.1  % Final    Basophil % 10/10/2022 0.5  % Final    Iron 10/10/2022 70  45 - 160 mcg/dL Final   Office Visit on 08/30/2022   Component Date Value Ref Range Status    SARS Coronavirus 2 Antigen 08/30/2022 Positive (A)  Negative Final     Acceptable 08/30/2022 Yes   Final      (Not in a hospital admission)    Review of patient's allergies indicates:   Allergen Reactions    Adhesive Dermatitis     Current Outpatient Medications on File Prior to Visit   Medication Sig Dispense Refill    baclofen (LIORESAL) 10 MG tablet Take 10 mg by mouth once daily at 6am.      GLATIRAMER ACETATE (COPAXONE 40MG/ML) Inject 1 Syringe into the skin 3 (three) times a week.      methyl salicylate-menthol 10-3 % PtMd Apply topically.      multivitamin (THERAGRAN) per tablet Take 1 tablet by mouth once daily.      olmesartan-hydrochlorothiazide (BENICAR HCT) 40-12.5 mg Tab Take 1 tablet by mouth once daily. 90 tablet 3    pulse oximeter (PULSE OXIMETER) device by Apply Externally route 2 (two) times a day. Use twice daily at 8 AM and 3 PM and [facility] the [facility] as directed. 1 each 0    vitamin D 1000 units Tab Take 1,000 Units by mouth once daily.      [DISCONTINUED] pregabalin (LYRICA) 100 MG capsule Take by mouth 3 (three) times daily.       azelastine (ASTELIN) 137 mcg (0.1 %) nasal spray 1 spray (137 mcg total) by Nasal route 2 (two) times daily. 30 mL 0    valACYclovir (VALTREX) 500 MG tablet Take 1 tablet (500 mg total) by mouth 2 (two) times daily. for 5 days 10 tablet 0     No current facility-administered medications on  file prior to visit.     Past Medical History:   Diagnosis Date    Anemia     Hypertension     Lumbar pain     Restless leg syndrome      Past Surgical History:   Procedure Laterality Date     SECTION      COLONOSCOPY N/A 2022    Procedure: COLONOSCOPY;  Surgeon: Luna Borrero MD;  Location: [facility];  Service: Endoscopy;  Laterality: N/A;  ppm sent    DECOMPRESSION OF CERVICAL SPINE BY ANTERIOR APPROACH WITH FUSION  2022    dilatation & curretage      HYSTERECTOMY  2017     Family History   Problem Relation Age of Onset    Cancer Maternal Grandmother     Colon cancer Maternal Grandmother     Breast cancer Neg Hx     Ovarian cancer Neg Hx      Social History     Tobacco Use    Smoking status: Former    Smokeless tobacco: Never   Substance Use Topics    Alcohol use: Yes     Comment: rare    Drug use: No      Health Maintenance Topics with due status: Not Due       Topic Last Completion Date    Lipid Panel 10/27/2020    TETANUS VACCINE 2021    Mammogram 10/21/2022    Colorectal Cancer Screening 2022     Immunization History   Administered Date(s) Administered    COVID-19, MRNA, LN-S, PF (Pfizer) (Purple Cap) 2021, 2021, 2021    COVID-19, mRNA, LNP-S, bivalent booster, PF (PFIZER OMICRON) 2022    Influenza (FLUBLOK) - Quadrivalent - Recombinant - PF *Preferred* (egg allergy) 10/20/2020, 10/10/2022    Influenza - Quadrivalent 10/02/2019    Influenza - Quadrivalent - PF *Preferred* (6 months and older) 2015, 10/04/2016, 2021    Pneumococcal Conjugate - 20 Valent 2022    Pneumococcal Polysaccharide - 23 Valent 2020    Tdap 2021       Review of Systems   Constitutional:  Negative for activity change, fatigue and unexpected weight change.   HENT:  Negative for hearing loss, postnasal drip, sinus pressure, sore throat and voice change.    Eyes:  Negative for photophobia and visual disturbance.   Respiratory:  Negative for cough,  "shortness of breath and wheezing.    Cardiovascular:  Negative for chest pain and palpitations.   Gastrointestinal:  Negative for constipation, diarrhea and nausea.   Genitourinary:  Negative for difficulty urinating, frequency, hematuria and urgency.   Musculoskeletal:  Positive for arthralgias, myalgias and neck stiffness. Negative for back pain.   Skin:  Negative for rash.   Neurological:  Negative for weakness, light-headedness and headaches.   Hematological:  Negative for adenopathy. Does not bruise/bleed easily.   Psychiatric/Behavioral:  The patient is not nervous/anxious.     OBJECTIVE:      Vitals:    12/21/22 0957   BP: 114/70   Pulse: 61   SpO2: 99%   Weight: 99.3 kg (219 lb)   Height: 5' 9" (1.753 m)     Physical Exam  Constitutional:       Appearance: Normal appearance.   HENT:      Head: Normocephalic and atraumatic.      Mouth/Throat:      Mouth: Mucous membranes are moist.   Eyes:      Conjunctiva/sclera: Conjunctivae normal.   Cardiovascular:      Rate and Rhythm: Normal rate and regular rhythm.   Pulmonary:      Effort: Pulmonary effort is normal.   Musculoskeletal:         General: No swelling.      Right lower leg: No edema.      Left lower leg: No edema.   Skin:     Comments: Scars and discoloration to shoulders   Neurological:      General: No focal deficit present.      Mental Status: She is alert and oriented to person, place, and time.   Psychiatric:         Mood and Affect: Mood normal.         Behavior: Behavior normal.      Assessment:       1. Annual physical exam    2. Essential hypertension    3. Multiple sclerosis    4. Need for pneumococcal vaccination        Plan:       Annual physical exam    Essential hypertension    Multiple sclerosis    Need for pneumococcal vaccination  -     Pneumococcal Conjugate Vaccine (20 Valent) (IM)      Continue current care    Counseled on age and gender appropriate medical preventative services, including cancer screenings, immunizations, overall " nutritional health, need for a consistent exercise regimen and an overall push towards maintaining a vigorous and active lifestyle.      Follow up in about 6 months (around 6/21/2023) for HTN.

## 2022-12-30 DIAGNOSIS — M54.12 RADICULOPATHY, CERVICAL REGION: Primary | ICD-10-CM

## 2023-01-19 ENCOUNTER — HOSPITAL ENCOUNTER (OUTPATIENT)
Dept: RADIOLOGY | Facility: HOSPITAL | Age: 53
Discharge: HOME | End: 2023-01-19
Attending: PHYSICIAN ASSISTANT
Payer: COMMERCIAL

## 2023-01-19 DIAGNOSIS — M54.12 RADICULOPATHY, CERVICAL REGION: ICD-10-CM

## 2023-01-19 PROCEDURE — 72040 X-RAY EXAM NECK SPINE 2-3 VW: CPT | Mod: TC,PO

## 2023-02-28 ENCOUNTER — OFFICE VISIT (OUTPATIENT)
Dept: URGENT CARE | Facility: CLINIC | Age: 53
End: 2023-02-28
Payer: COMMERCIAL

## 2023-02-28 ENCOUNTER — TELEPHONE (OUTPATIENT)
Dept: FAMILY MEDICINE | Facility: CLINIC | Age: 53
End: 2023-02-28

## 2023-02-28 VITALS
DIASTOLIC BLOOD PRESSURE: 89 MMHG | BODY MASS INDEX: 30.57 KG/M2 | HEART RATE: 62 BPM | HEIGHT: 69 IN | RESPIRATION RATE: 16 BRPM | OXYGEN SATURATION: 99 % | TEMPERATURE: 97 F | WEIGHT: 206.38 LBS | SYSTOLIC BLOOD PRESSURE: 147 MMHG

## 2023-02-28 DIAGNOSIS — T78.40XA ALLERGY, INITIAL ENCOUNTER: ICD-10-CM

## 2023-02-28 DIAGNOSIS — J34.9 SINUS PROBLEM: Primary | ICD-10-CM

## 2023-02-28 LAB
CTP QC/QA: YES
CTP QC/QA: YES
FLUAV AG NPH QL: NEGATIVE
FLUBV AG NPH QL: NEGATIVE
SARS-COV-2 AG RESP QL IA.RAPID: NEGATIVE

## 2023-02-28 PROCEDURE — 99214 PR OFFICE/OUTPT VISIT, EST, LEVL IV, 30-39 MIN: ICD-10-PCS | Mod: S$GLB,,, | Performed by: STUDENT IN AN ORGANIZED HEALTH CARE EDUCATION/TRAINING PROGRAM

## 2023-02-28 PROCEDURE — 3008F PR BODY MASS INDEX (BMI) DOCUMENTED: ICD-10-PCS | Mod: CPTII,S$GLB,, | Performed by: STUDENT IN AN ORGANIZED HEALTH CARE EDUCATION/TRAINING PROGRAM

## 2023-02-28 PROCEDURE — 3077F PR MOST RECENT SYSTOLIC BLOOD PRESSURE >= 140 MM HG: ICD-10-PCS | Mod: CPTII,S$GLB,, | Performed by: STUDENT IN AN ORGANIZED HEALTH CARE EDUCATION/TRAINING PROGRAM

## 2023-02-28 PROCEDURE — 3008F BODY MASS INDEX DOCD: CPT | Mod: CPTII,S$GLB,, | Performed by: STUDENT IN AN ORGANIZED HEALTH CARE EDUCATION/TRAINING PROGRAM

## 2023-02-28 PROCEDURE — 99214 OFFICE O/P EST MOD 30 MIN: CPT | Mod: S$GLB,,, | Performed by: STUDENT IN AN ORGANIZED HEALTH CARE EDUCATION/TRAINING PROGRAM

## 2023-02-28 PROCEDURE — 3077F SYST BP >= 140 MM HG: CPT | Mod: CPTII,S$GLB,, | Performed by: STUDENT IN AN ORGANIZED HEALTH CARE EDUCATION/TRAINING PROGRAM

## 2023-02-28 PROCEDURE — 1159F MED LIST DOCD IN RCRD: CPT | Mod: CPTII,S$GLB,, | Performed by: STUDENT IN AN ORGANIZED HEALTH CARE EDUCATION/TRAINING PROGRAM

## 2023-02-28 PROCEDURE — 3079F PR MOST RECENT DIASTOLIC BLOOD PRESSURE 80-89 MM HG: ICD-10-PCS | Mod: CPTII,S$GLB,, | Performed by: STUDENT IN AN ORGANIZED HEALTH CARE EDUCATION/TRAINING PROGRAM

## 2023-02-28 PROCEDURE — 87804 POCT INFLUENZA A/B: ICD-10-PCS | Mod: 59,QW,, | Performed by: STUDENT IN AN ORGANIZED HEALTH CARE EDUCATION/TRAINING PROGRAM

## 2023-02-28 PROCEDURE — 87804 INFLUENZA ASSAY W/OPTIC: CPT | Mod: 59,QW,, | Performed by: STUDENT IN AN ORGANIZED HEALTH CARE EDUCATION/TRAINING PROGRAM

## 2023-02-28 PROCEDURE — 1159F PR MEDICATION LIST DOCUMENTED IN MEDICAL RECORD: ICD-10-PCS | Mod: CPTII,S$GLB,, | Performed by: STUDENT IN AN ORGANIZED HEALTH CARE EDUCATION/TRAINING PROGRAM

## 2023-02-28 PROCEDURE — 87811 SARS CORONAVIRUS 2 ANTIGEN POCT, MANUAL READ: ICD-10-PCS | Mod: QW,S$GLB,, | Performed by: STUDENT IN AN ORGANIZED HEALTH CARE EDUCATION/TRAINING PROGRAM

## 2023-02-28 PROCEDURE — 3079F DIAST BP 80-89 MM HG: CPT | Mod: CPTII,S$GLB,, | Performed by: STUDENT IN AN ORGANIZED HEALTH CARE EDUCATION/TRAINING PROGRAM

## 2023-02-28 PROCEDURE — 87811 SARS-COV-2 COVID19 W/OPTIC: CPT | Mod: QW,S$GLB,, | Performed by: STUDENT IN AN ORGANIZED HEALTH CARE EDUCATION/TRAINING PROGRAM

## 2023-02-28 RX ORDER — PROMETHAZINE HYDROCHLORIDE AND DEXTROMETHORPHAN HYDROBROMIDE 6.25; 15 MG/5ML; MG/5ML
5 SYRUP ORAL EVERY 4 HOURS PRN
Qty: 118 ML | Refills: 0 | Status: SHIPPED | OUTPATIENT
Start: 2023-02-28 | End: 2023-03-10

## 2023-02-28 RX ORDER — GUAIFENESIN 600 MG/1
1200 TABLET, EXTENDED RELEASE ORAL 2 TIMES DAILY
Qty: 30 TABLET | Refills: 0 | Status: SHIPPED | OUTPATIENT
Start: 2023-02-28 | End: 2024-02-27

## 2023-02-28 RX ORDER — LEVOCETIRIZINE DIHYDROCHLORIDE 5 MG/1
5 TABLET, FILM COATED ORAL NIGHTLY
Qty: 30 TABLET | Refills: 0 | Status: SHIPPED | OUTPATIENT
Start: 2023-02-28 | End: 2024-02-27

## 2023-02-28 NOTE — TELEPHONE ENCOUNTER
Spoke with patient offered next available 3/1/23 at 8am.  Patient declined next available, states she will seek care at urgent care today -DN

## 2023-02-28 NOTE — PROGRESS NOTES
"Subjective:       Patient ID: Eleazar Dickson is a 52 y.o. female.    Vitals:  height is 5' 9" (1.753 m) and weight is 93.6 kg (206 lb 6.4 oz). Her oral temperature is 97.3 °F (36.3 °C). Her blood pressure is 147/89 (abnormal) and her pulse is 62. Her respiration is 16 and oxygen saturation is 99%.     Chief Complaint: Sinus Problem    Ambulatory to room with complaint of cough and congestion, admits to pain mucus production with cough.  Denies nausea vomiting or diarrhea.  Denies sore throat.  Denies chest pain or palpitations.  Denies exacerbating or remitting factors.  Denies sick contacts.  States needs to be checked for COVID for work.    Sinus Problem  This is a new problem. Episode onset: 4 days ago. The problem has been gradually worsening since onset. There has been no fever. Associated symptoms include congestion, coughing and sneezing. Treatments tried: Dayquil/Nyquil, Tessalon pearls. The treatment provided no relief.     HENT:  Positive for congestion.    Respiratory:  Positive for cough.    Allergic/Immunologic: Positive for sneezing.     Objective:      Physical Exam   Constitutional: She is oriented to person, place, and time. She appears well-developed. She is cooperative.  Non-toxic appearance. She does not appear ill. No distress.   HENT:   Head: Normocephalic and atraumatic.   Ears:   Right Ear: Hearing, tympanic membrane, external ear and ear canal normal.   Left Ear: Hearing, tympanic membrane, external ear and ear canal normal.   Nose: Rhinorrhea and congestion present. No mucosal edema or nasal deformity. No epistaxis. Right sinus exhibits no maxillary sinus tenderness and no frontal sinus tenderness. Left sinus exhibits no maxillary sinus tenderness and no frontal sinus tenderness.   Mouth/Throat: Uvula is midline, oropharynx is clear and moist and mucous membranes are normal. No trismus in the jaw. Normal dentition. No uvula swelling. No oropharyngeal exudate, posterior oropharyngeal " edema or posterior oropharyngeal erythema.   Eyes: Conjunctivae and lids are normal. No scleral icterus.   Neck: Trachea normal and phonation normal. Neck supple. No edema present. No erythema present. No neck rigidity present.   Cardiovascular: Normal rate, regular rhythm, normal heart sounds and normal pulses.   Pulmonary/Chest: Effort normal and breath sounds normal. No respiratory distress. She has no decreased breath sounds. She has no rhonchi.   Abdominal: Normal appearance.   Musculoskeletal: Normal range of motion.         General: No deformity. Normal range of motion.   Neurological: She is alert and oriented to person, place, and time. She exhibits normal muscle tone. Coordination normal.   Skin: Skin is warm, dry, intact, not diaphoretic and not pale.   Psychiatric: Her speech is normal and behavior is normal. Judgment and thought content normal.   Nursing note and vitals reviewed.      Assessment:       1. Sinus problem    2. Allergy, initial encounter          Plan:         Sinus problem  -     SARS Coronavirus 2 Antigen, POCT Manual Read  -     POCT Influenza A/B Rapid Antigen    Allergy, initial encounter    Other orders  -     guaiFENesin (MUCINEX) 600 mg 12 hr tablet; Take 2 tablets (1,200 mg total) by mouth 2 (two) times daily.  Dispense: 30 tablet; Refill: 0  -     promethazine-dextromethorphan (PROMETHAZINE-DM) 6.25-15 mg/5 mL Syrp; Take 5 mLs by mouth every 4 (four) hours as needed.  Dispense: 118 mL; Refill: 0  -     levocetirizine (XYZAL) 5 MG tablet; Take 1 tablet (5 mg total) by mouth every evening.  Dispense: 30 tablet; Refill: 0            COVID and flu were both negative.  We discussed symptomatic treatment.

## 2023-02-28 NOTE — LETTER
February 28, 2023      [facility]  [address]  [facility] [address]  Phone: [phone]       Patient: Eleazar Dickson   YOB: 1970  Date of Visit: 02/28/2023    To Whom It May Concern:    Charmaine Dickson  was at [facility] on 02/28/2023. The patient may return to work/school on 03/01/2023 with no restrictions. If you have any questions or concerns, or if I can be of further assistance, please do not hesitate to contact me.    Sincerely,    Sher Ramírez NP

## 2023-02-28 NOTE — PATIENT INSTRUCTIONS
Thankyou for the opportunity to care for you today.  Please take all medications as directed, continue any previous prescribed medications unless we specifically discussed holding them.  If your symptoms do not resolve or worsen please return to the clinic for re-evaluation, if your situation becomes emergent please present to to the nearest emergency department.  Follow-up with your PCP for continued evaluation and management.

## 2023-02-28 NOTE — TELEPHONE ENCOUNTER
----- Message from Barb Wolfe MA sent at 2/28/2023  9:48 AM CST -----  Regarding: sick visit  Complains of sinus and chest congestion w/clear mucus, no fever. Would like to be seen today  [phone]

## 2023-05-10 ENCOUNTER — TELEPHONE (OUTPATIENT)
Dept: FAMILY MEDICINE | Facility: CLINIC | Age: 53
End: 2023-05-10

## 2023-05-10 DIAGNOSIS — H91.90 DECREASED HEARING, UNSPECIFIED LATERALITY: Primary | ICD-10-CM

## 2023-05-10 NOTE — TELEPHONE ENCOUNTER
----- Message from Vibha Taylor sent at 5/10/2023  3:57 PM CDT -----  Patient called and stated that she would like a referral for an ENT for her she feel like she has something in her ear (left ) please give her a call and advise when its sent out . Please call her at [phone]

## 2023-05-10 NOTE — TELEPHONE ENCOUNTER
Spoke with patient states she cannot hear her inner voice, states she also feels as if a wax build up or something is in her ear.  Would like to see ent.  Referral pended, to MD for approval -ADI

## 2023-05-11 ENCOUNTER — TELEPHONE (OUTPATIENT)
Dept: FAMILY MEDICINE | Facility: CLINIC | Age: 53
End: 2023-05-11

## 2023-05-11 NOTE — TELEPHONE ENCOUNTER
Left message to ntoify patient of ENT referral.  Informed patient to return call with questions/concerns -DN

## 2023-05-11 NOTE — TELEPHONE ENCOUNTER
----- Message from Lucía Arias sent at 5/11/2023  2:45 PM CDT -----  Pt is returning the office call. Pt #[phone]

## 2023-05-23 ENCOUNTER — TELEPHONE (OUTPATIENT)
Dept: FAMILY MEDICINE | Facility: CLINIC | Age: 53
End: 2023-05-23

## 2023-05-23 NOTE — TELEPHONE ENCOUNTER
----- Message from Kim Moulton MA sent at 5/23/2023  3:24 PM CDT -----  Regarding: appt change  Pt calling to see if she can be seen sooner than 6/20 because she is having issues with swelling in her hands and her insurance will be changing as well so she would like to be seen before then. [phone]

## 2023-05-23 NOTE — TELEPHONE ENCOUNTER
----- Message from Vibha Taylor sent at 5/23/2023  3:53 PM CDT -----  Patient called and stated that she was calling Rosana back please give her a call at [phone]

## 2023-05-24 NOTE — TELEPHONE ENCOUNTER
Spoke with patient , patient unable to reschedule to available times at this time.  Patient states she will return call to schedule -DN

## 2023-06-20 ENCOUNTER — OFFICE VISIT (OUTPATIENT)
Dept: FAMILY MEDICINE | Facility: CLINIC | Age: 53
End: 2023-06-20
Payer: COMMERCIAL

## 2023-06-20 ENCOUNTER — TELEPHONE (OUTPATIENT)
Dept: FAMILY MEDICINE | Facility: CLINIC | Age: 53
End: 2023-06-20

## 2023-06-20 VITALS
WEIGHT: 201.63 LBS | SYSTOLIC BLOOD PRESSURE: 124 MMHG | HEART RATE: 78 BPM | HEIGHT: 67 IN | OXYGEN SATURATION: 98 % | BODY MASS INDEX: 31.65 KG/M2 | DIASTOLIC BLOOD PRESSURE: 80 MMHG

## 2023-06-20 DIAGNOSIS — G35 MULTIPLE SCLEROSIS: ICD-10-CM

## 2023-06-20 DIAGNOSIS — M19.041 PRIMARY OSTEOARTHRITIS OF BOTH HANDS: ICD-10-CM

## 2023-06-20 DIAGNOSIS — H69.92 EUSTACHIAN TUBE DYSFUNCTION, LEFT: ICD-10-CM

## 2023-06-20 DIAGNOSIS — M19.042 PRIMARY OSTEOARTHRITIS OF BOTH HANDS: ICD-10-CM

## 2023-06-20 DIAGNOSIS — I10 ESSENTIAL HYPERTENSION: Primary | ICD-10-CM

## 2023-06-20 PROCEDURE — 99214 OFFICE O/P EST MOD 30 MIN: CPT | Mod: S$GLB,,, | Performed by: FAMILY MEDICINE

## 2023-06-20 PROCEDURE — 3008F PR BODY MASS INDEX (BMI) DOCUMENTED: ICD-10-PCS | Mod: CPTII,S$GLB,, | Performed by: FAMILY MEDICINE

## 2023-06-20 PROCEDURE — 3008F BODY MASS INDEX DOCD: CPT | Mod: CPTII,S$GLB,, | Performed by: FAMILY MEDICINE

## 2023-06-20 PROCEDURE — 1159F MED LIST DOCD IN RCRD: CPT | Mod: CPTII,S$GLB,, | Performed by: FAMILY MEDICINE

## 2023-06-20 PROCEDURE — 3074F PR MOST RECENT SYSTOLIC BLOOD PRESSURE < 130 MM HG: ICD-10-PCS | Mod: CPTII,S$GLB,, | Performed by: FAMILY MEDICINE

## 2023-06-20 PROCEDURE — 1159F PR MEDICATION LIST DOCUMENTED IN MEDICAL RECORD: ICD-10-PCS | Mod: CPTII,S$GLB,, | Performed by: FAMILY MEDICINE

## 2023-06-20 PROCEDURE — 3074F SYST BP LT 130 MM HG: CPT | Mod: CPTII,S$GLB,, | Performed by: FAMILY MEDICINE

## 2023-06-20 PROCEDURE — 3079F DIAST BP 80-89 MM HG: CPT | Mod: CPTII,S$GLB,, | Performed by: FAMILY MEDICINE

## 2023-06-20 PROCEDURE — 3079F PR MOST RECENT DIASTOLIC BLOOD PRESSURE 80-89 MM HG: ICD-10-PCS | Mod: CPTII,S$GLB,, | Performed by: FAMILY MEDICINE

## 2023-06-20 PROCEDURE — 99214 PR OFFICE/OUTPT VISIT, EST, LEVL IV, 30-39 MIN: ICD-10-PCS | Mod: S$GLB,,, | Performed by: FAMILY MEDICINE

## 2023-06-20 RX ORDER — OLMESARTAN MEDOXOMIL AND HYDROCHLOROTHIAZIDE 40/12.5 40; 12.5 MG/1; MG/1
1 TABLET ORAL DAILY
Qty: 90 TABLET | Refills: 3 | Status: SHIPPED | OUTPATIENT
Start: 2023-06-20 | End: 2024-02-27

## 2023-06-20 RX ORDER — METHYLPREDNISOLONE 4 MG/1
TABLET ORAL
Qty: 21 EACH | Refills: 0 | Status: SHIPPED | OUTPATIENT
Start: 2023-06-20 | End: 2023-07-11

## 2023-06-20 NOTE — TELEPHONE ENCOUNTER
----- Message from Barb Wolfe MA sent at 6/20/2023  3:41 PM CDT -----  Regarding: running late  Patient is just getting off [facility] for her 3:40 appt today. I informed that she could be 15 min. Late. She states she thinks she will make appt.

## 2023-06-20 NOTE — PROGRESS NOTES
SUBJECTIVE:    Patient ID: Eleazar Dickson is a 52 y.o. female.    Chief Complaint: Follow-up (6 mo follow up/ left hand middle and ring finger are painful and swelling several months/ right hip catches and locks in place for several months/ left ear can't hear herself when yawns//mp)    Patient past medical history significant for multiple sclerosis presents for visit.  She has several musculoskeletal complaints.  For the past several months she has been having swelling and tenderness to her joints in her hands.  Mostly fingers.  She is also getting similar symptoms in her right hip.  Feels that it catches and locks in place when she rises from a seated position that she has been in for some time.      Patient hypertension that is currently well controlled as she has been able to manage her weight better.  Over the past 8 months she has managed to lose close to 40 lb.  She is taking her medication without difficulty.    Additionally patient has been having some issues with her left ear.  No status she yawns the ear has a fullness and she has difficulty hearing.  Reports no drainage but does have some occasional pain.      Past Medical History:   Diagnosis Date    Anemia     Hypertension     Lumbar pain     Restless leg syndrome      Past Surgical History:   Procedure Laterality Date     SECTION      COLONOSCOPY N/A 2022    Procedure: COLONOSCOPY;  Surgeon: Luna Borrero MD;  Location: [facility];  Service: Endoscopy;  Laterality: N/A;  ppm sent    DECOMPRESSION OF CERVICAL SPINE BY ANTERIOR APPROACH WITH FUSION  2022    dilatation & curretage      HYSTERECTOMY  2017     Family History   Problem Relation Age of Onset    Cancer Maternal Grandmother     Colon cancer Maternal Grandmother     Breast cancer Neg Hx     Ovarian cancer Neg Hx        Marital Status:   Alcohol History:  reports current alcohol use.  Tobacco History:  reports that she has quit smoking. She has never used  smokeless tobacco.  Drug History:  reports no history of drug use.    Review of patient's allergies indicates:   Allergen Reactions    Adhesive Dermatitis       Current Outpatient Medications:     azelastine (ASTELIN) 137 mcg (0.1 %) nasal spray, 1 spray (137 mcg total) by Nasal route 2 (two) times daily., Disp: 30 mL, Rfl: 0    baclofen (LIORESAL) 10 MG tablet, Take 10 mg by mouth once daily at 6am., Disp: , Rfl:     guaiFENesin (MUCINEX) 600 mg 12 hr tablet, Take 2 tablets (1,200 mg total) by mouth 2 (two) times daily., Disp: 30 tablet, Rfl: 0    levocetirizine (XYZAL) 5 MG tablet, Take 1 tablet (5 mg total) by mouth every evening., Disp: 30 tablet, Rfl: 0    multivitamin (THERAGRAN) per tablet, Take 1 tablet by mouth once daily., Disp: , Rfl:     valACYclovir (VALTREX) 500 MG tablet, Take 1 tablet (500 mg total) by mouth 2 (two) times daily. for 5 days, Disp: 10 tablet, Rfl: 0    vitamin D 1000 units Tab, Take 1,000 Units by mouth once daily., Disp: , Rfl:     GLATIRAMER ACETATE (COPAXONE 40MG/ML), Inject 1 Syringe into the skin 3 (three) times a week., Disp: , Rfl:     methyl salicylate-menthol 10-3 % PtMd, Apply topically., Disp: , Rfl:     methylPREDNISolone (MEDROL DOSEPACK) 4 mg tablet, use as directed, Disp: 21 each, Rfl: 0    olmesartan-hydrochlorothiazide (BENICAR HCT) 40-12.5 mg Tab, Take 1 tablet by mouth once daily., Disp: 90 tablet, Rfl: 3    Review of Systems   Constitutional:  Negative for activity change, fatigue and unexpected weight change.   HENT:  Positive for ear pain and hearing loss. Negative for postnasal drip, sinus pressure, sore throat and voice change.    Eyes:  Negative for photophobia and visual disturbance.   Respiratory:  Negative for cough, shortness of breath and wheezing.    Cardiovascular:  Negative for chest pain and palpitations.   Gastrointestinal:  Negative for constipation, diarrhea and nausea.   Genitourinary:  Negative for difficulty urinating, frequency, hematuria and  "urgency.   Musculoskeletal:  Positive for arthralgias and joint swelling. Negative for back pain.   Skin:  Negative for rash.   Neurological:  Negative for weakness, light-headedness and headaches.   Hematological:  Negative for adenopathy. Does not bruise/bleed easily.   Psychiatric/Behavioral:  The patient is not nervous/anxious.         Objective:      Vitals:    06/20/23 1605   BP: 124/80   Pulse: 78   SpO2: 98%   Weight: 91.4 kg (201 lb 9.6 oz)   Height: 5' 7.32" (1.71 m)     Physical Exam  Constitutional:       Appearance: Normal appearance.   HENT:      Head: Normocephalic and atraumatic.      Jaw: There is normal jaw occlusion.      Right Ear: Tympanic membrane, ear canal and external ear normal.      Left Ear: Tympanic membrane, ear canal and external ear normal.      Mouth/Throat:      Mouth: Mucous membranes are moist.   Eyes:      Conjunctiva/sclera: Conjunctivae normal.   Pulmonary:      Effort: Pulmonary effort is normal.   Musculoskeletal:      Right hand: Swelling and deformity present.      Left hand: Swelling and deformity present.      Comments: Hypertrophy at PIP joints of bilateral middle and ring fingers   Neurological:      General: No focal deficit present.      Mental Status: She is alert and oriented to person, place, and time.   Psychiatric:         Mood and Affect: Mood normal.         Behavior: Behavior normal.         Assessment:       1. Essential hypertension    2. Multiple sclerosis    3. Primary osteoarthritis of both hands    4. Eustachian tube dysfunction, left         Plan:       Essential hypertension  -     olmesartan-hydrochlorothiazide (BENICAR HCT) 40-12.5 mg Tab; Take 1 tablet by mouth once daily.  Dispense: 90 tablet; Refill: 3    Multiple sclerosis  Desires to stay off any biologics    Primary osteoarthritis of both hands  -     methylPREDNISolone (MEDROL DOSEPACK) 4 mg tablet; use as directed  Dispense: 21 each; Refill: 0  Will test for gout or rheumatoid arthritis if " she has another flare    Eustachian tube dysfunction, left  The patient is to make appointment with ENT.  Recommend decongestants    Follow up in about 6 months (around 12/20/2023) for Annual Physical.

## 2023-12-15 ENCOUNTER — TELEPHONE (OUTPATIENT)
Dept: FAMILY MEDICINE | Facility: CLINIC | Age: 53
End: 2023-12-15

## 2023-12-15 DIAGNOSIS — I10 ESSENTIAL HYPERTENSION: Primary | ICD-10-CM

## 2023-12-15 DIAGNOSIS — Z00.00 ANNUAL PHYSICAL EXAM: ICD-10-CM

## 2023-12-15 DIAGNOSIS — Z79.899 ENCOUNTER FOR LONG-TERM (CURRENT) USE OF OTHER MEDICATIONS: ICD-10-CM

## 2023-12-15 DIAGNOSIS — D50.8 IRON DEFICIENCY ANEMIA SECONDARY TO INADEQUATE DIETARY IRON INTAKE: ICD-10-CM

## 2023-12-15 NOTE — TELEPHONE ENCOUNTER
----- Message from RT Kat sent at 12/14/2023 10:23 AM CST -----    ----- Message -----  From: Lula Giron LPN  Sent: 12/5/2023   8:40 AM CST  To: RT Kat      ----- Message -----  From: Marcy lFowers MD  Sent: 12/1/2023  12:00 AM CST  To: Marcy Flowers Staff    Remind patient to get labs prior to upcoming appointment  Annual labs

## 2023-12-15 NOTE — TELEPHONE ENCOUNTER
Spoke with patient notified fasting lab orders prior to visit, patient verbalized understanding -DN

## 2023-12-22 NOTE — TELEPHONE ENCOUNTER
I am unable to sign the orders for the patient to go to [facility]. See if you can do. Get cbc, cmp and lipid thank you.

## 2023-12-26 ENCOUNTER — TELEPHONE (OUTPATIENT)
Dept: FAMILY MEDICINE | Facility: CLINIC | Age: 53
End: 2023-12-26
Payer: COMMERCIAL

## 2023-12-26 NOTE — TELEPHONE ENCOUNTER
----- Message from Karmen Irwin sent at 12/26/2023 12:32 PM CST -----  Pt needs to reschedule her 1/3 appointment. She missed a call from [facility] last week.  [phone]

## 2024-01-22 ENCOUNTER — TELEPHONE (OUTPATIENT)
Dept: INTERNAL MEDICINE | Facility: CLINIC | Age: 54
End: 2024-01-22
Payer: COMMERCIAL

## 2024-02-15 ENCOUNTER — TELEPHONE (OUTPATIENT)
Dept: FAMILY MEDICINE | Facility: CLINIC | Age: 54
End: 2024-02-15
Payer: COMMERCIAL

## 2024-02-15 NOTE — TELEPHONE ENCOUNTER
Left mess that fasting lab is due prior to visit, orders are external, asked patient to call with any questions.

## 2024-02-27 ENCOUNTER — OFFICE VISIT (OUTPATIENT)
Dept: FAMILY MEDICINE | Facility: CLINIC | Age: 54
End: 2024-02-27
Attending: FAMILY MEDICINE
Payer: COMMERCIAL

## 2024-02-27 VITALS
HEIGHT: 67 IN | WEIGHT: 207 LBS | SYSTOLIC BLOOD PRESSURE: 134 MMHG | BODY MASS INDEX: 32.49 KG/M2 | OXYGEN SATURATION: 98 % | DIASTOLIC BLOOD PRESSURE: 86 MMHG | HEART RATE: 70 BPM

## 2024-02-27 DIAGNOSIS — Z12.31 ENCOUNTER FOR SCREENING MAMMOGRAM FOR MALIGNANT NEOPLASM OF BREAST: ICD-10-CM

## 2024-02-27 DIAGNOSIS — D50.8 IRON DEFICIENCY ANEMIA SECONDARY TO INADEQUATE DIETARY IRON INTAKE: ICD-10-CM

## 2024-02-27 DIAGNOSIS — Z00.00 ANNUAL PHYSICAL EXAM: Primary | ICD-10-CM

## 2024-02-27 DIAGNOSIS — I10 PRIMARY HYPERTENSION: ICD-10-CM

## 2024-02-27 DIAGNOSIS — E55.9 VITAMIN D DEFICIENCY: ICD-10-CM

## 2024-02-27 PROCEDURE — 4010F ACE/ARB THERAPY RXD/TAKEN: CPT | Mod: CPTII,S$GLB,, | Performed by: FAMILY MEDICINE

## 2024-02-27 PROCEDURE — 1159F MED LIST DOCD IN RCRD: CPT | Mod: CPTII,S$GLB,, | Performed by: FAMILY MEDICINE

## 2024-02-27 PROCEDURE — 99396 PREV VISIT EST AGE 40-64: CPT | Mod: S$GLB,,, | Performed by: FAMILY MEDICINE

## 2024-02-27 PROCEDURE — 3008F BODY MASS INDEX DOCD: CPT | Mod: CPTII,S$GLB,, | Performed by: FAMILY MEDICINE

## 2024-02-27 PROCEDURE — 3075F SYST BP GE 130 - 139MM HG: CPT | Mod: CPTII,S$GLB,, | Performed by: FAMILY MEDICINE

## 2024-02-27 PROCEDURE — 3079F DIAST BP 80-89 MM HG: CPT | Mod: CPTII,S$GLB,, | Performed by: FAMILY MEDICINE

## 2024-02-27 RX ORDER — IBUPROFEN 200 MG
200 TABLET ORAL EVERY 6 HOURS PRN
COMMUNITY

## 2024-02-27 RX ORDER — OLMESARTAN MEDOXOMIL 20 MG/1
20 TABLET ORAL DAILY
Qty: 90 TABLET | Refills: 3 | Status: SHIPPED | OUTPATIENT
Start: 2024-02-27 | End: 2025-02-26

## 2024-02-27 NOTE — PROGRESS NOTES
SUBJECTIVE:   HPI: Eleazar Dickson  is a 53 y.o. female who presents for annual physical .   Annual Exam and c/o L leg/ankle pain and numbness    Patient with hypertension and cervical radiculopathy with history of cervical fusion is in for her annual exam.  She has been working on weight loss in her eating habits and shows a discontinue her blood pressure medications.  She has not checked her pressures.  Values today look fine.      Mammogram is due.  She is up-to-date with colon cancer screening.  Labs are due.  She has history of hysterectomy secondary to dysfunctional uterine bleeding.      (Not in a hospital admission)    Review of patient's allergies indicates:   Allergen Reactions    Adhesive Dermatitis     Current Outpatient Medications on File Prior to Visit   Medication Sig Dispense Refill    baclofen (LIORESAL) 10 MG tablet Take 10 mg by mouth once daily at 6am.      ibuprofen (ADVIL,MOTRIN) 200 MG tablet Take 200 mg by mouth every 6 (six) hours as needed for Pain.      multivitamin (THERAGRAN) per tablet Take 1 tablet by mouth once daily.      [DISCONTINUED] azelastine (ASTELIN) 137 mcg (0.1 %) nasal spray 1 spray (137 mcg total) by Nasal route 2 (two) times daily. 30 mL 0    [DISCONTINUED] GLATIRAMER ACETATE (COPAXONE 40MG/ML) Inject 1 Syringe into the skin 3 (three) times a week.      [DISCONTINUED] guaiFENesin (MUCINEX) 600 mg 12 hr tablet Take 2 tablets (1,200 mg total) by mouth 2 (two) times daily. 30 tablet 0    [DISCONTINUED] levocetirizine (XYZAL) 5 MG tablet Take 1 tablet (5 mg total) by mouth every evening. 30 tablet 0    [DISCONTINUED] methyl salicylate-menthol 10-3 % PtMd Apply topically.      [DISCONTINUED] olmesartan-hydrochlorothiazide (BENICAR HCT) 40-12.5 mg Tab Take 1 tablet by mouth once daily. 90 tablet 3    [DISCONTINUED] vitamin D 1000 units Tab Take 1,000 Units by mouth once daily.      [DISCONTINUED] valACYclovir (VALTREX) 500 MG tablet Take 1 tablet (500 mg total) by  mouth 2 (two) times daily. for 5 days 10 tablet 0     No current facility-administered medications on file prior to visit.     Past Medical History:   Diagnosis Date    Anemia     Hypertension     Lumbar pain     Restless leg syndrome      Past Surgical History:   Procedure Laterality Date     SECTION      COLONOSCOPY N/A 2022    Procedure: COLONOSCOPY;  Surgeon: Luna Borrero MD;  [facility]: [facility];  Service: Endoscopy;  Laterality: N/A;  ppm sent    DECOMPRESSION OF CERVICAL SPINE BY ANTERIOR APPROACH WITH FUSION  2022    dilatation & curretage      HYSTERECTOMY  2017     Family History   Problem Relation Age of Onset    Cancer Maternal Grandmother     Colon cancer Maternal Grandmother     Breast cancer Neg Hx     Ovarian cancer Neg Hx      Social History     Tobacco Use    Smoking status: Former    Smokeless tobacco: Never   Substance Use Topics    Alcohol use: Yes     Comment: rare    Drug use: No      Health Maintenance Topics with due status: Not Due       Topic Last Completion Date    Lipid Panel 10/27/2020    TETANUS VACCINE 2021    Colorectal Cancer Screening 2022     Immunization History   Administered Date(s) Administered    COVID-19, MRNA, LN-S, PF (Pfizer) (Purple Cap) 2021, 2021, 2021    COVID-19, mRNA, LNP-S, PF, carmen-sucrose, 30 mcg/0.3 mL (Pfizer  Ages 12+) 10/14/2023, 2023    COVID-19, mRNA, LNP-S, bivalent booster, PF (PFIZER OMICRON) 2022    Influenza (FLUBLOK) - Quadrivalent - Recombinant - PF *Preferred* (egg allergy) 10/20/2020, 10/10/2022    Influenza - Quadrivalent 10/02/2019    Influenza - Quadrivalent - PF *Preferred* (6 months and older) 2015, 10/04/2016, 2021    Pneumococcal Conjugate - 20 Valent 2022    Pneumococcal Polysaccharide - 23 Valent 2020    Tdap 2021       Review of Systems   Constitutional:  Negative for activity change, fatigue and unexpected weight change.   HENT:   "Negative for hearing loss, postnasal drip, sinus pressure, sore throat and voice change.    Eyes:  Negative for photophobia and visual disturbance.   Respiratory:  Negative for cough, shortness of breath and wheezing.    Cardiovascular:  Negative for chest pain and palpitations.   Gastrointestinal:  Negative for constipation, diarrhea and nausea.   Genitourinary:  Negative for difficulty urinating, frequency, hematuria and urgency.   Musculoskeletal:  Positive for arthralgias and myalgias. Negative for back pain.   Skin:  Negative for rash.   Neurological:  Positive for numbness. Negative for weakness, light-headedness and headaches.   Hematological:  Negative for adenopathy. Does not bruise/bleed easily.   Psychiatric/Behavioral:  The patient is not nervous/anxious.       OBJECTIVE:      Vitals:    02/27/24 1542   BP: 134/86   Pulse: 70   SpO2: 98%   Weight: 93.9 kg (207 lb)   Height: 5' 7" (1.702 m)     Physical Exam  Vitals reviewed.   Constitutional:       General: She is not in acute distress.     Appearance: Normal appearance. She is well-developed.   HENT:      Head: Normocephalic and atraumatic.      Right Ear: External ear normal.      Left Ear: External ear normal.      Nose: Nose normal.      Mouth/Throat:      Mouth: Mucous membranes are moist.   Eyes:      General: Lids are normal.      Conjunctiva/sclera: Conjunctivae normal.      Pupils: Pupils are equal, round, and reactive to light.   Neck:      Thyroid: No thyromegaly.      Vascular: No JVD.      Trachea: No tracheal deviation.   Cardiovascular:      Rate and Rhythm: Normal rate and regular rhythm.      Chest Wall: PMI is not displaced.      Pulses: Normal pulses.      Heart sounds: Normal heart sounds.   Pulmonary:      Effort: Pulmonary effort is normal.      Breath sounds: Normal breath sounds.   Abdominal:      General: Bowel sounds are normal.      Palpations: Abdomen is soft.      Tenderness: There is no abdominal tenderness. There is no " guarding or rebound.   Musculoskeletal:      Cervical back: Neck supple. Tenderness present. Decreased range of motion.   Skin:     General: Skin is warm and dry.      Findings: No rash.   Neurological:      General: No focal deficit present.      Mental Status: She is alert and oriented to person, place, and time.   Psychiatric:         Mood and Affect: Mood normal.         Behavior: Behavior normal.        Assessment:       1. Annual physical exam    2. Primary hypertension    3. Encounter for screening mammogram for malignant neoplasm of breast    4. Vitamin D deficiency    5. Iron deficiency anemia secondary to inadequate dietary iron intake        Plan:       Annual physical exam  -     CBC Auto Differential; Future; Expected date: 02/27/2024  -     Comprehensive Metabolic Panel; Future; Expected date: 02/27/2024  -     Lipid Panel; Future; Expected date: 02/27/2024  -     Urinalysis, Reflex to Urine Culture Urine, Clean Catch; Future; Expected date: 02/27/2024  -     Hemoglobin A1C; Future; Expected date: 02/27/2024  -     Cancel: Microalbumin/Creatinine Ratio, Urine  -     Microalbumin/Creatinine Ratio, Urine; Future; Expected date: 02/27/2024    Primary hypertension  -     olmesartan (BENICAR) 20 MG tablet; Take 1 tablet (20 mg total) by mouth once daily.  Dispense: 90 tablet; Refill: 3    Encounter for screening mammogram for malignant neoplasm of breast  -     Mammo Digital Screening Bilat w/ Ruben; Future; Expected date: 02/27/2024    Vitamin D deficiency  -     Vitamin D; Future; Expected date: 02/27/2024    Iron deficiency anemia secondary to inadequate dietary iron intake  -     CBC Auto Differential; Future; Expected date: 02/27/2024        Counseled on age and gender appropriate medical preventative services, including cancer screenings, immunizations, overall nutritional health, need for a consistent exercise regimen and an overall push towards maintaining a vigorous and active lifestyle.      Follow  up in about 6 months (around 8/27/2024) for HTN.

## 2024-02-29 ENCOUNTER — TELEPHONE (OUTPATIENT)
Dept: FAMILY MEDICINE | Facility: CLINIC | Age: 54
End: 2024-02-29
Payer: COMMERCIAL

## 2024-03-01 LAB
25(OH)D3+25(OH)D2 SERPL-MCNC: 32 NG/ML (ref 30–100)
ALBUMIN SERPL-MCNC: 4.3 G/DL (ref 3.6–5.1)
ALBUMIN/CREAT UR: 4 MCG/MG CREAT
ALBUMIN/GLOB SERPL: 1.7 (CALC) (ref 1–2.5)
ALP SERPL-CCNC: 63 U/L (ref 37–153)
ALT SERPL-CCNC: 14 U/L (ref 6–29)
APPEARANCE UR: CLEAR
AST SERPL-CCNC: 15 U/L (ref 10–35)
BACTERIA #/AREA URNS HPF: NORMAL /HPF
BACTERIA UR CULT: NORMAL
BASOPHILS # BLD AUTO: 21 CELLS/UL (ref 0–200)
BASOPHILS NFR BLD AUTO: 0.6 %
BILIRUB SERPL-MCNC: 0.6 MG/DL (ref 0.2–1.2)
BILIRUB UR QL STRIP: NEGATIVE
BUN SERPL-MCNC: 9 MG/DL (ref 7–25)
BUN/CREAT SERPL: ABNORMAL (CALC) (ref 6–22)
CALCIUM SERPL-MCNC: 10.8 MG/DL (ref 8.6–10.4)
CHLORIDE SERPL-SCNC: 107 MMOL/L (ref 98–110)
CHOLEST SERPL-MCNC: 175 MG/DL
CHOLEST/HDLC SERPL: 2.1 (CALC)
CO2 SERPL-SCNC: 28 MMOL/L (ref 20–32)
COLOR UR: YELLOW
CREAT SERPL-MCNC: 0.75 MG/DL (ref 0.5–1.03)
CREAT UR-MCNC: 76 MG/DL (ref 20–275)
EGFR: 96 ML/MIN/1.73M2
EOSINOPHIL # BLD AUTO: 91 CELLS/UL (ref 15–500)
EOSINOPHIL NFR BLD AUTO: 2.6 %
ERYTHROCYTE [DISTWIDTH] IN BLOOD BY AUTOMATED COUNT: 11.8 % (ref 11–15)
GLOBULIN SER CALC-MCNC: 2.5 G/DL (CALC) (ref 1.9–3.7)
GLUCOSE SERPL-MCNC: 88 MG/DL (ref 65–99)
GLUCOSE UR QL STRIP: NEGATIVE
HCT VFR BLD AUTO: 41.2 % (ref 35–45)
HDLC SERPL-MCNC: 83 MG/DL
HGB BLD-MCNC: 13.7 G/DL (ref 11.7–15.5)
HGB UR QL STRIP: NEGATIVE
HYALINE CASTS #/AREA URNS LPF: NORMAL /LPF
KETONES UR QL STRIP: NEGATIVE
LDLC SERPL CALC-MCNC: 78 MG/DL (CALC)
LEUKOCYTE ESTERASE UR QL STRIP: NEGATIVE
LYMPHOCYTES # BLD AUTO: 1071 CELLS/UL (ref 850–3900)
LYMPHOCYTES NFR BLD AUTO: 30.6 %
MCH RBC QN AUTO: 30.2 PG (ref 27–33)
MCHC RBC AUTO-ENTMCNC: 33.3 G/DL (ref 32–36)
MCV RBC AUTO: 90.9 FL (ref 80–100)
MICROALBUMIN UR-MCNC: 0.3 MG/DL
MONOCYTES # BLD AUTO: 364 CELLS/UL (ref 200–950)
MONOCYTES NFR BLD AUTO: 10.4 %
NEUTROPHILS # BLD AUTO: 1953 CELLS/UL (ref 1500–7800)
NEUTROPHILS NFR BLD AUTO: 55.8 %
NITRITE UR QL STRIP: NEGATIVE
NONHDLC SERPL-MCNC: 92 MG/DL (CALC)
PH UR STRIP: 7 [PH] (ref 5–8)
PLATELET # BLD AUTO: 290 THOUSAND/UL (ref 140–400)
PMV BLD REES-ECKER: 10.2 FL (ref 7.5–12.5)
POTASSIUM SERPL-SCNC: 5.1 MMOL/L (ref 3.5–5.3)
PROT SERPL-MCNC: 6.8 G/DL (ref 6.1–8.1)
PROT UR QL STRIP: NEGATIVE
RBC # BLD AUTO: 4.53 MILLION/UL (ref 3.8–5.1)
RBC #/AREA URNS HPF: NORMAL /HPF
SERVICE CMNT-IMP: NORMAL
SODIUM SERPL-SCNC: 141 MMOL/L (ref 135–146)
SP GR UR STRIP: 1.01 (ref 1–1.03)
SQUAMOUS #/AREA URNS HPF: NORMAL /HPF
TRIGL SERPL-MCNC: 60 MG/DL
WBC # BLD AUTO: 3.5 THOUSAND/UL (ref 3.8–10.8)
WBC #/AREA URNS HPF: NORMAL /HPF

## 2024-03-07 DIAGNOSIS — R92.8 ABNORMAL MAMMOGRAM: Primary | ICD-10-CM

## 2024-06-05 ENCOUNTER — HOSPITAL ENCOUNTER (OUTPATIENT)
Dept: RADIOLOGY | Facility: HOSPITAL | Age: 54
Discharge: HOME | End: 2024-06-05
Attending: FAMILY MEDICINE
Payer: COMMERCIAL

## 2024-06-05 VITALS — HEIGHT: 67 IN | BODY MASS INDEX: 32.49 KG/M2 | WEIGHT: 207 LBS

## 2024-06-05 DIAGNOSIS — R92.8 ABNORMAL MAMMOGRAM: ICD-10-CM

## 2024-06-05 PROCEDURE — 77062 BREAST TOMOSYNTHESIS BI: CPT | Mod: TC,PO

## 2024-06-05 PROCEDURE — 76642 ULTRASOUND BREAST LIMITED: CPT | Mod: 26,RT,, | Performed by: RADIOLOGY

## 2024-06-05 PROCEDURE — 77066 DX MAMMO INCL CAD BI: CPT | Mod: 26,,, | Performed by: RADIOLOGY

## 2024-06-05 PROCEDURE — 76642 ULTRASOUND BREAST LIMITED: CPT | Mod: TC,PO,RT

## 2024-06-05 PROCEDURE — 77062 BREAST TOMOSYNTHESIS BI: CPT | Mod: 26,,, | Performed by: RADIOLOGY

## 2024-07-27 ENCOUNTER — OFFICE VISIT (OUTPATIENT)
Dept: URGENT CARE | Facility: CLINIC | Age: 54
End: 2024-07-27
Payer: COMMERCIAL

## 2024-07-27 VITALS
HEART RATE: 62 BPM | BODY MASS INDEX: 32.42 KG/M2 | RESPIRATION RATE: 20 BRPM | DIASTOLIC BLOOD PRESSURE: 86 MMHG | TEMPERATURE: 99 F | WEIGHT: 207 LBS | OXYGEN SATURATION: 98 % | SYSTOLIC BLOOD PRESSURE: 156 MMHG

## 2024-07-27 DIAGNOSIS — Z20.822 COVID-19 VIRUS NOT DETECTED: Primary | ICD-10-CM

## 2024-07-27 DIAGNOSIS — Z20.822 ENCOUNTER FOR LABORATORY TESTING FOR COVID-19 VIRUS: ICD-10-CM

## 2024-07-27 DIAGNOSIS — Z20.822 EXPOSURE TO CONFIRMED CASE OF COVID-19: ICD-10-CM

## 2024-07-27 PROCEDURE — 99214 OFFICE O/P EST MOD 30 MIN: CPT | Mod: S$GLB,,,

## 2024-07-27 RX ORDER — COVID-19 ANTIGEN TEST
1 KIT MISCELLANEOUS
Qty: 2 EACH | Refills: 3 | Status: SHIPPED | OUTPATIENT
Start: 2024-07-27 | End: 2024-07-31

## 2024-07-27 NOTE — PROGRESS NOTES
Subjective:      Patient ID: Eleazar Dickson is a 53 y.o. female.    Vitals:  weight is 93.9 kg (207 lb). Her oral temperature is 98.6 °F (37 °C). Her blood pressure is 156/86 (abnormal) and her pulse is 62. Her respiration is 20 and oxygen saturation is 98%.     Chief Complaint: Exposure to Covid    Patient would like to be tested for Covid. Patient's daughter tested positive today.  Recently returned home from [place].  Had postnasal drip, but contributed to coworkers vaping in the office.  Symptoms resolved once she left exposure.  Patient is COVID-19 vaccinated      Allergic/Immunologic: Positive for immunizations up-to-date.      Objective:     Physical Exam   Constitutional: She is oriented to person, place, and time. She is cooperative.   HENT:   Head: Normocephalic and atraumatic.   Ears:   Right Ear: External ear normal.   Left Ear: External ear normal.   Nose: Nose normal.   Mouth/Throat: Uvula is midline, oropharynx is clear and moist and mucous membranes are normal. Mucous membranes are moist. Oropharynx is clear.   Eyes: Conjunctivae and lids are normal. Pupils are equal, round, and reactive to light. Extraocular movement intact   Neck: Trachea normal and phonation normal. Neck supple.   Cardiovascular: Normal rate, regular rhythm, normal heart sounds and normal pulses.   Pulmonary/Chest: Effort normal and breath sounds normal.   Abdominal: Normal appearance.   Musculoskeletal: Normal range of motion.         General: Normal range of motion.   Neurological: no focal deficit. She is alert, oriented to person, place, and time and at baseline. She has normal motor skills, normal sensation and intact cranial nerves (2-12). Gait and coordination normal. GCS eye subscore is 4. GCS verbal subscore is 5. GCS motor subscore is 6.   Skin: Skin is warm and dry. Capillary refill takes 2 to 3 seconds.   Psychiatric: She experiences Normal attention and Normal perception. Her speech is normal and behavior  is normal. Mood, judgment and thought content normal.   Nursing note and vitals reviewed.      Assessment:     1. COVID-19 virus not detected    2. Encounter for laboratory testing for COVID-19 virus    3. Exposure to confirmed case of COVID-19      2 COVID risk score    Plan:       COVID-19 virus not detected    Encounter for laboratory testing for COVID-19 virus    Exposure to confirmed case of COVID-19  -     COVID-19 antigen test (COVID-19 AT-HOME TEST) Kit; 1 each by Misc.(Non-Drug; Combo Route) route every 48 hours as needed (COVID like symptoms).  Dispense: 2 each; Refill: 3    Other orders  -     molnupiravir 200 mg capsule (EUA); Take 4 capsules (800 mg total) by mouth every 12 (twelve) hours. for 5 days  Dispense: 40 capsule; Refill: 0           COVID risk score 2.  Known exposure at home with family member.  Currently asymptomatic.

## 2024-07-27 NOTE — PATIENT INSTRUCTIONS
Repeat COVID test in 48 hours.    Do not take Molnupiravir unless symptoms begin/ worsen, or have positive test.

## 2024-08-27 ENCOUNTER — OFFICE VISIT (OUTPATIENT)
Dept: FAMILY MEDICINE | Facility: CLINIC | Age: 54
End: 2024-08-27
Payer: COMMERCIAL

## 2024-08-27 VITALS
OXYGEN SATURATION: 99 % | BODY MASS INDEX: 32.96 KG/M2 | DIASTOLIC BLOOD PRESSURE: 76 MMHG | HEART RATE: 66 BPM | HEIGHT: 67 IN | WEIGHT: 210 LBS | SYSTOLIC BLOOD PRESSURE: 126 MMHG

## 2024-08-27 DIAGNOSIS — I10 PRIMARY HYPERTENSION: Primary | ICD-10-CM

## 2024-08-27 DIAGNOSIS — E55.9 VITAMIN D DEFICIENCY: ICD-10-CM

## 2024-08-27 DIAGNOSIS — M54.16 LUMBAR RADICULOPATHY: ICD-10-CM

## 2024-08-27 DIAGNOSIS — N95.1 HOT FLUSHES, PERIMENOPAUSAL: ICD-10-CM

## 2024-08-27 PROCEDURE — 3078F DIAST BP <80 MM HG: CPT | Mod: CPTII,S$GLB,, | Performed by: FAMILY MEDICINE

## 2024-08-27 PROCEDURE — 3074F SYST BP LT 130 MM HG: CPT | Mod: CPTII,S$GLB,, | Performed by: FAMILY MEDICINE

## 2024-08-27 PROCEDURE — 99214 OFFICE O/P EST MOD 30 MIN: CPT | Mod: S$GLB,,, | Performed by: FAMILY MEDICINE

## 2024-08-27 PROCEDURE — 1159F MED LIST DOCD IN RCRD: CPT | Mod: CPTII,S$GLB,, | Performed by: FAMILY MEDICINE

## 2024-08-27 PROCEDURE — 3008F BODY MASS INDEX DOCD: CPT | Mod: CPTII,S$GLB,, | Performed by: FAMILY MEDICINE

## 2024-08-27 PROCEDURE — 3066F NEPHROPATHY DOC TX: CPT | Mod: CPTII,S$GLB,, | Performed by: FAMILY MEDICINE

## 2024-08-27 PROCEDURE — 4010F ACE/ARB THERAPY RXD/TAKEN: CPT | Mod: CPTII,S$GLB,, | Performed by: FAMILY MEDICINE

## 2024-08-27 PROCEDURE — 3061F NEG MICROALBUMINURIA REV: CPT | Mod: CPTII,S$GLB,, | Performed by: FAMILY MEDICINE

## 2024-08-27 RX ORDER — MELOXICAM 7.5 MG/1
7.5 TABLET ORAL EVERY 12 HOURS PRN
COMMUNITY
Start: 2024-05-10 | End: 2024-08-27 | Stop reason: SDUPTHER

## 2024-08-27 RX ORDER — MELOXICAM 7.5 MG/1
7.5 TABLET ORAL EVERY 12 HOURS PRN
Qty: 60 TABLET | Refills: 5 | Status: SHIPPED | OUTPATIENT
Start: 2024-08-27

## 2024-08-27 NOTE — PROGRESS NOTES
SUBJECTIVE:    Patient ID: Eleazar Dickson is a 53 y.o. female.    Chief Complaint: 8M HTN Check Up  (-HepC / HIV Screening Due/-Shingles Vaccination Due), discuss need for pap, discuss menopause, and discuss rx for meloxicam    Has had injections with Dr. Capps and evalaution by Dr Mckenzie. She has had an injection to her lumbar spine and still with some pain. She has lumbar DDD and facet arthritis. Has h/o cervical spine decompression .  Has additional arthralgias and had been on meloxicam in the past and found it beneficial.  She would like to resume this medication.      Blood pressure well-controlled on current dose of olmesartan.  Last labs showed normal CBC and CMP.    Has history of partial hysterectomy but retains ovaries.  She has been having hot flashes and mood swings the past few months consistent with perimenopausal symptoms.  She has used black cohosh with benefit    She is continuing to work on weight but has gained 10 lb secondary to decreased ability to exercise due to back pain        No visits with results within 6 Month(s) from this visit.   Latest known visit with results is:   Office Visit on 02/27/2024   Component Date Value Ref Range Status    WBC 02/29/2024 3.5 (L)  3.8 - 10.8 Thousand/uL Final    RBC 02/29/2024 4.53  3.80 - 5.10 Million/uL Final    Hemoglobin 02/29/2024 13.7  11.7 - 15.5 g/dL Final    Hematocrit 02/29/2024 41.2  35.0 - 45.0 % Final    MCV 02/29/2024 90.9  80.0 - 100.0 fL Final    MCH 02/29/2024 30.2  27.0 - 33.0 pg Final    MCHC 02/29/2024 33.3  32.0 - 36.0 g/dL Final    RDW 02/29/2024 11.8  11.0 - 15.0 % Final    Platelets 02/29/2024 290  140 - 400 Thousand/uL Final    MPV 02/29/2024 10.2  7.5 - 12.5 fL Final    Neutrophils, Abs 02/29/2024 1,953  1,500 - 7,800 cells/uL Final    Lymph # 02/29/2024 1,071  850 - 3,900 cells/uL Final    Mono # 02/29/2024 364  200 - 950 cells/uL Final    Eos # 02/29/2024 91  15 - 500 cells/uL Final    Baso # 02/29/2024 21  0 - 200 cells/uL  Final    Neutrophils Relative 02/29/2024 55.8  % Final    Lymph % 02/29/2024 30.6  % Final    Mono % 02/29/2024 10.4  % Final    Eosinophil % 02/29/2024 2.6  % Final    Basophil % 02/29/2024 0.6  % Final    Glucose 02/29/2024 88  65 - 99 mg/dL Final    BUN 02/29/2024 9  7 - 25 mg/dL Final    Creatinine 02/29/2024 0.75  0.50 - 1.03 mg/dL Final    eGFR 02/29/2024 96  > OR = 60 mL/min/1.73m2 Final    BUN/Creatinine Ratio 02/29/2024 SEE NOTE:  6 - 22 (calc) Final    Sodium 02/29/2024 141  135 - 146 mmol/L Final    Potassium 02/29/2024 5.1  3.5 - 5.3 mmol/L Final    Chloride 02/29/2024 107  98 - 110 mmol/L Final    CO2 02/29/2024 28  20 - 32 mmol/L Final    Calcium 02/29/2024 10.8 (H)  8.6 - 10.4 mg/dL Final    Total Protein 02/29/2024 6.8  6.1 - 8.1 g/dL Final    Albumin 02/29/2024 4.3  3.6 - 5.1 g/dL Final    Globulin, Total 02/29/2024 2.5  1.9 - 3.7 g/dL (calc) Final    Albumin/Globulin Ratio 02/29/2024 1.7  1.0 - 2.5 (calc) Final    Total Bilirubin 02/29/2024 0.6  0.2 - 1.2 mg/dL Final    Alkaline Phosphatase 02/29/2024 63  37 - 153 U/L Final    AST 02/29/2024 15  10 - 35 U/L Final    ALT 02/29/2024 14  6 - 29 U/L Final    Cholesterol 02/29/2024 175  <200 mg/dL Final    HDL 02/29/2024 83  > OR = 50 mg/dL Final    Triglycerides 02/29/2024 60  <150 mg/dL Final    LDL Cholesterol 02/29/2024 78  mg/dL (calc) Final    HDL/Cholesterol Ratio 02/29/2024 2.1  <5.0 (calc) Final    Non HDL Chol. (LDL+VLDL) 02/29/2024 92  <130 mg/dL (calc) Final    Color, UA 02/29/2024 YELLOW  YELLOW Final    Appearance, UA 02/29/2024 CLEAR  CLEAR Final    Specific Gravity, UA 02/29/2024 1.011  1.001 - 1.035 Final    pH, UA 02/29/2024 7.0  5.0 - 8.0 Final    Glucose, UA 02/29/2024 NEGATIVE  NEGATIVE Final    Bilirubin, UA 02/29/2024 NEGATIVE  NEGATIVE Final    Ketones, UA 02/29/2024 NEGATIVE  NEGATIVE Final    Occult Blood UA 02/29/2024 NEGATIVE  NEGATIVE Final    Protein, UA 02/29/2024 NEGATIVE  NEGATIVE Final    Nitrite, UA 02/29/2024 NEGATIVE   NEGATIVE Final    Leukocytes, UA 2024 NEGATIVE  NEGATIVE Final    WBC Casts, UA 2024 NONE SEEN  < OR = 5 /HPF Final    RBC Casts, UA 2024 NONE SEEN  < OR = 2 /HPF Final    Squam Epithel, UA 2024 NONE SEEN  < OR = 5 /HPF Final    Bacteria, UA 2024 NONE SEEN  NONE SEEN /HPF Final    Hyaline Casts, UA 2024 NONE SEEN  NONE SEEN /LPF Final    Service Cmt: 2024    Final    Reflexive Urine Culture 2024    Final    Vitamin D, 25-OH, Total 2024 32  30 - 100 ng/mL Final    Creatinine, Urine 2024 76  20 - 275 mg/dL Final    Microalb, Ur 2024 0.3  See Note: mg/dL Final    Microalb/Creat Ratio 2024 4  <30 mcg/mg creat Final      Past Medical History:   Diagnosis Date    Anemia     Hypertension     Lumbar pain     Restless leg syndrome      Past Surgical History:   Procedure Laterality Date     SECTION      COLONOSCOPY N/A 2022    Procedure: COLONOSCOPY;  Surgeon: Luna Borrero MD;  Location: [facility];  Service: Endoscopy;  Laterality: N/A;  ppm sent    DECOMPRESSION OF CERVICAL SPINE BY ANTERIOR APPROACH WITH FUSION  2022    dilatation & curretage      HYSTERECTOMY  2017     Family History   Problem Relation Name Age of Onset    Cancer Maternal Grandmother      Colon cancer Maternal Grandmother      Breast cancer Neg Hx      Ovarian cancer Neg Hx         Marital Status:   Alcohol History:  reports current alcohol use.  Tobacco History:  reports that she has quit smoking. She has never used smokeless tobacco.  Drug History:  reports no history of drug use.    Review of patient's allergies indicates:   Allergen Reactions    Adhesive Dermatitis       Current Outpatient Medications:     baclofen (LIORESAL) 10 MG tablet, Take 10 mg by mouth once daily at 6am., Disp: , Rfl:     cyanocobalamin, vitamin B-12, (VITAMIN B-12 ORAL), Take 1 tablet by mouth once daily., Disp: , Rfl:     multivitamin (THERAGRAN) per tablet, Take 1 tablet  "by mouth once daily., Disp: , Rfl:     olmesartan (BENICAR) 20 MG tablet, Take 1 tablet (20 mg total) by mouth once daily., Disp: 90 tablet, Rfl: 3    meloxicam (MOBIC) 7.5 MG tablet, Take 1 tablet (7.5 mg total) by mouth every 12 (twelve) hours as needed for Pain., Disp: 60 tablet, Rfl: 5    Review of Systems   Constitutional:  Negative for activity change, fatigue and unexpected weight change.   HENT:  Negative for hearing loss, postnasal drip, sinus pressure, sore throat and voice change.    Eyes:  Negative for photophobia and visual disturbance.   Respiratory:  Negative for cough, shortness of breath and wheezing.    Cardiovascular:  Negative for chest pain and palpitations.   Gastrointestinal:  Negative for constipation, diarrhea and nausea.   Genitourinary:  Negative for difficulty urinating, frequency, hematuria and urgency.   Musculoskeletal:  Positive for arthralgias and back pain.   Skin:  Negative for rash.   Neurological:  Negative for weakness, light-headedness and headaches.   Hematological:  Negative for adenopathy. Does not bruise/bleed easily.   Psychiatric/Behavioral:  The patient is not nervous/anxious.           Objective:          7/27/2024     3:07 PM 8/27/2024     3:50 PM   Vitals - 1 value per visit   SYSTOLIC 156 126   DIASTOLIC 86 76   Pulse 62 66   Temp 98.6 °F (37 °C)    Resp 20    SPO2 98 % 99 %   Weight (lb) 207 210   Weight (kg) 93.895 95.255   Height  5' 7" (1.702 m)   BMI (Calculated)  32.9      Physical Exam  Constitutional:       Appearance: Normal appearance.   HENT:      Head: Normocephalic and atraumatic.      Mouth/Throat:      Mouth: Mucous membranes are moist.   Eyes:      Conjunctiva/sclera: Conjunctivae normal.   Pulmonary:      Effort: Pulmonary effort is normal.   Neurological:      General: No focal deficit present.      Mental Status: She is alert and oriented to person, place, and time.   Psychiatric:         Mood and Affect: Mood normal.         Behavior: Behavior " normal.           Assessment:       1. Primary hypertension    2. Hot flushes, perimenopausal    3. Vitamin D deficiency    4. Lumbar radiculopathy         Plan:       Primary hypertension  -     CBC Auto Differential; Future; Expected date: 02/10/2025  -     Microalbumin/Creatinine Ratio, Urine; Future; Expected date: 02/10/2025  -     Comprehensive Metabolic Panel; Future; Expected date: 02/10/2025    Hot flushes, perimenopausal  Comments:  Continue black cohosh    Vitamin D deficiency  -     Vitamin D; Future; Expected date: 02/10/2025    Lumbar radiculopathy  -     meloxicam (MOBIC) 7.5 MG tablet; Take 1 tablet (7.5 mg total) by mouth every 12 (twelve) hours as needed for Pain.  Dispense: 60 tablet; Refill: 5      Medication List with Changes/Refills   Current Medications    BACLOFEN (LIORESAL) 10 MG TABLET    Take 10 mg by mouth once daily at 6am.    CYANOCOBALAMIN, VITAMIN B-12, (VITAMIN B-12 ORAL)    Take 1 tablet by mouth once daily.    MULTIVITAMIN (THERAGRAN) PER TABLET    Take 1 tablet by mouth once daily.    OLMESARTAN (BENICAR) 20 MG TABLET    Take 1 tablet (20 mg total) by mouth once daily.   Changed and/or Refilled Medications    Modified Medication Previous Medication    MELOXICAM (MOBIC) 7.5 MG TABLET meloxicam (MOBIC) 7.5 MG tablet       Take 1 tablet (7.5 mg total) by mouth every 12 (twelve) hours as needed for Pain.    Take 7.5 mg by mouth every 12 (twelve) hours as needed.   Discontinued Medications    COVID-19 ANTIGEN TEST (COVID-19 AT-HOME TEST) KIT    1 each by Misc.(Non-Drug; Combo Route) route every 48 hours as needed (COVID like symptoms).    IBUPROFEN (ADVIL,MOTRIN) 200 MG TABLET    Take 200 mg by mouth every 6 (six) hours as needed for Pain.    MOLNUPIRAVIR 200 MG CAPSULE (EUA)    Take 4 capsules (800 mg total) by mouth every 12 (twelve) hours. for 5 days      Follow up in about 6 months (around 2/27/2025) for Annual Physical.      Stable on medications continue current

## 2025-01-13 DIAGNOSIS — M54.16 LUMBAR RADICULOPATHY: ICD-10-CM

## 2025-01-13 NOTE — TELEPHONE ENCOUNTER
Please see the attached refill request.  Next Appt this Specialty: With Family Medicine (Marcy Flowers MD)  02/20/2025 at 3:40 PM

## 2025-01-14 RX ORDER — MELOXICAM 15 MG/1
15 TABLET ORAL DAILY
Qty: 90 TABLET | Refills: 1 | Status: SHIPPED | OUTPATIENT
Start: 2025-01-14

## 2025-02-20 ENCOUNTER — OFFICE VISIT (OUTPATIENT)
Dept: FAMILY MEDICINE | Facility: CLINIC | Age: 55
End: 2025-02-20
Payer: COMMERCIAL

## 2025-02-20 VITALS
BODY MASS INDEX: 34.06 KG/M2 | SYSTOLIC BLOOD PRESSURE: 170 MMHG | WEIGHT: 217 LBS | OXYGEN SATURATION: 98 % | DIASTOLIC BLOOD PRESSURE: 92 MMHG | HEIGHT: 67 IN | HEART RATE: 99 BPM

## 2025-02-20 DIAGNOSIS — Z12.31 ENCOUNTER FOR SCREENING MAMMOGRAM FOR MALIGNANT NEOPLASM OF BREAST: ICD-10-CM

## 2025-02-20 DIAGNOSIS — I10 PRIMARY HYPERTENSION: ICD-10-CM

## 2025-02-20 DIAGNOSIS — M54.16 LUMBAR RADICULOPATHY: ICD-10-CM

## 2025-02-20 DIAGNOSIS — E66.811 CLASS 1 OBESITY WITH SERIOUS COMORBIDITY AND BODY MASS INDEX (BMI) OF 33.0 TO 33.9 IN ADULT, UNSPECIFIED OBESITY TYPE: ICD-10-CM

## 2025-02-20 DIAGNOSIS — N95.1 HOT FLUSHES, PERIMENOPAUSAL: ICD-10-CM

## 2025-02-20 DIAGNOSIS — E55.9 VITAMIN D DEFICIENCY: ICD-10-CM

## 2025-02-20 DIAGNOSIS — Z00.00 ANNUAL PHYSICAL EXAM: Primary | ICD-10-CM

## 2025-02-20 RX ORDER — OLMESARTAN MEDOXOMIL 20 MG/1
20 TABLET ORAL DAILY
Qty: 90 TABLET | Refills: 3 | Status: SHIPPED | OUTPATIENT
Start: 2025-02-20 | End: 2026-02-20

## 2025-02-20 NOTE — PROGRESS NOTES
SUBJECTIVE:   HPI: Eleazar Dickson  is a 54 y.o. female who presents for annual physical .   8M HTN Check Up    History of Present Illness    CHIEF COMPLAINT:  Patient presents today for annual hypertension follow up.    HYPERTENSION:  She continues Benicar 20 mg for hypertension. She reports elevated blood pressure today, which she attributes to rushing to the appointment.    MUSCULOSKELETAL:  She has lumbar radiculopathy and degenerative disc disease with right-sided symptoms including leg cracking with bending, foot dragging, and whole right side involvement. She continues meloxicam for arthritis and joint pain. She has received two epidural steroid injections and is requesting another one. She has a history of cervical spinal stenosis status post surgical intervention.    NEUROLOGICAL:  She has a previous diagnosis of multiple sclerosis. However, her neurosurgeon, Dr. Mckenzie, recently expressed doubt about this diagnosis.    GYNECOLOGICAL:  She experiences occasional short-duration hot flashes, particularly noticeable when lying in bed. She has a history of hysterectomy with ovaries intact.    PREVENTIVE CARE:  Mammogram in  showed benign cysts without changes. Colonoscopy in  was normal without polyps.         (Not in a hospital admission)    Review of patient's allergies indicates:   Allergen Reactions    Adhesive Dermatitis     Medications Ordered Prior to Encounter[1]  Past Medical History:   Diagnosis Date    Anemia     Hypertension     Lumbar pain     Restless leg syndrome      Past Surgical History:   Procedure Laterality Date     SECTION      COLONOSCOPY N/A 2022    Procedure: COLONOSCOPY;  Surgeon: Luna Borrero MD;  Location: [facility];  Service: Endoscopy;  Laterality: N/A;  ppm sent    DECOMPRESSION OF CERVICAL SPINE BY ANTERIOR APPROACH WITH FUSION  2022    dilatation & curretage      HYSTERECTOMY  2017     Family History   Problem Relation Name Age of Onset     Cancer Maternal Grandmother      Colon cancer Maternal Grandmother      Breast cancer Neg Hx      Ovarian cancer Neg Hx       Social History[2]   Health Maintenance Topics with due status: Not Due       Topic Last Completion Date    TETANUS VACCINE 03/30/2021    Colorectal Cancer Screening 12/14/2022    Lipid Panel 02/29/2024    Mammogram 06/05/2024    RSV Vaccine (Age 60+ and Pregnant patients) Not Due     Immunization History   Administered Date(s) Administered    COVID-19, MRNA, LN-S, PF (Pfizer) (Purple Cap) 04/30/2021, 05/21/2021, 12/13/2021    COVID-19, mRNA, LNP-S, PF, carmen-sucrose, 30 mcg/0.3 mL (Pfizer Ages 12+) 10/14/2023, 11/14/2023    COVID-19, mRNA, LNP-S, bivalent booster, PF (PFIZER OMICRON) 11/12/2022    Influenza (FLUBLOK) - Quadrivalent - Recombinant - PF *Preferred* (egg allergy) 10/20/2020, 10/10/2022    Influenza - Quadrivalent 10/02/2019    Influenza - Quadrivalent - PF *Preferred* (6 months and older) 09/24/2015, 10/04/2016, 12/13/2021, 10/14/2023    Influenza - Trivalent - Afluria, Fluzone MDV 10/22/2010    Pneumococcal Conjugate - 20 Valent 12/21/2022    Pneumococcal Polysaccharide - 23 Valent 12/01/2020    Tdap 03/30/2021       Review of Systems   Constitutional:  Negative for activity change, fatigue and unexpected weight change.   HENT:  Negative for hearing loss, postnasal drip, sinus pressure, sore throat and voice change.    Eyes:  Negative for photophobia and visual disturbance.   Respiratory:  Negative for cough, shortness of breath and wheezing.    Cardiovascular:  Negative for chest pain and palpitations.   Gastrointestinal:  Negative for constipation, diarrhea and nausea.   Genitourinary:  Negative for difficulty urinating, frequency, hematuria and urgency.   Musculoskeletal:  Positive for arthralgias. Negative for back pain.   Skin:  Negative for rash.   Neurological:  Negative for weakness, light-headedness and headaches.   Hematological:  Negative for adenopathy. Does not  "bruise/bleed easily.   Psychiatric/Behavioral:  The patient is not nervous/anxious.       OBJECTIVE:          8/27/2024     3:50 PM 2/20/2025     4:31 PM   Vitals - 1 value per visit   SYSTOLIC 126 166   DIASTOLIC 76 94   Pulse 66 99   SPO2 99 % 98 %   Weight (lb) 210 217   Weight (kg) 95.255 98.431   Height 5' 7" (1.702 m) 5' 7" (1.702 m)   BMI (Calculated) 32.9 34      Physical Exam  Constitutional:       Appearance: Normal appearance.   HENT:      Head: Normocephalic and atraumatic.      Mouth/Throat:      Mouth: Mucous membranes are moist.   Eyes:      Conjunctiva/sclera: Conjunctivae normal.   Pulmonary:      Effort: Pulmonary effort is normal.   Neurological:      General: No focal deficit present.      Mental Status: She is alert and oriented to person, place, and time.   Psychiatric:         Mood and Affect: Mood normal.         Behavior: Behavior normal.          Assessment:       Assessment & Plan    IMPRESSION:  - Assessed hypertension management; noted elevated BP today attributed to patient rushing to appointment  - Evaluated ongoing back and leg issues; patient reports increased foot dragging and swelling  - Considered degenerative disc disease as primary cause of symptoms  - Reviewed MS diagnosis; neurosurgeon does not believe patient has MS  - Assessed vitamin D levels and need for supplementation  - Reviewed mammogram results; noted benign cysts with no concerning changes  - Evaluated patient for perimenopause based on reported occasional hot flashes    HYPERTENSION:  - Measured blood pressure at 170/92, which is elevated compared to previous normal readings in August and February.  - Noted that the patient rushed to the appointment, potentially affecting the reading.  - Continued Benicar 20 mg for hypertension management.  - Instructed the patient to monitor blood pressure daily for 2 weeks and report values to the office.  - Advised potential medication adjustment if blood pressure remains " consistently elevated.  - Requested the patient to contact the office with home blood pressure readings in 2 weeks.    LUMBAR RADICULOPATHY:  - Noted increased foot dragging and stiffness in the patient's leg.  - Observed cracking sensation in the leg when bending, accompanied by swelling.  - Confirmed the official diagnosis of lumbar radiculopathy.  - Acknowledged that the patient has received 2 steroid injections and is seeking another one.  - Noted patient's symptoms on the right side, including the leg and hand.  - Confirmed the presence of degenerative disc disease.  - Discussed the progression of treatment from cervical to lumbar region.  - Acknowledged patient's consideration of back surgery and noted their hesitation.    CERVICAL SPINAL STENOSIS:  - Noted patient's history of cervical spinal stenosis and neck surgery.  - Confirmed that the patient has undergone decompression surgery for cervical spinal stenosis.    BENIGN BREAST CYSTS:  - Ordered mammogram for June to continue monitoring benign breast cysts.  - Noted that the patient's benign cysts in the breast are monitored through annual mammograms.    PERIMENOPAUSE:  - Noted that the patient reports occasional hot flashes and night sweats.  - Confirmed that the 54-year-old patient is perimenopausal.  - Explained perimenopausal state and potential symptoms.  - Discussed black cohosh as a supplement option for managing perimenopausal symptoms.  - Recommend black cohosh supplement for managing menopausal symptoms.    ARTHRITIS:  - Noted that the patient reports arthritis symptoms in hands.  - Continued meloxicam for arthritis and joint pain management.    WEIGHT MANAGEMENT:  - Patient to check with insurance about coverage for weight loss medications (Wegovy or Zepbound).    MEDICATIONS/SUPPLEMENTS:  - Start OTC Vitamin D 5,000 IU daily, pending lab results.  - Recommend vitamin D supplementation.    LABS:  - Lab work ordered: Vitamin D level, metabolic  panel, blood count, kidney function (non-fasting).    SHINGLES VACCINATION:  - Educated on shingles vaccine effectiveness (over 95%) for those who have had chickenpox.  - Shingles vaccine (2-dose series) administered.    OTHER INSTRUCTIONS:  - Noted that the neurosurgeon does not believe the patient has MS.  - Acknowledged the neurosurgeon's opinion regarding the absence of MS.  - Confirmed with the patient that ovaries were not removed during hysterectomy.    FOLLOW UP:  - Follow up in 6 months.           Plan:       Annual physical exam    Primary hypertension  -     olmesartan (BENICAR) 20 MG tablet; Take 1 tablet (20 mg total) by mouth once daily.  Dispense: 90 tablet; Refill: 3    Vitamin D deficiency    Lumbar radiculopathy    Encounter for screening mammogram for malignant neoplasm of breast  -     Mammo Digital Screening Bilat w/ Ruben (XPD); Future; Expected date: 06/20/2025    Class 1 obesity with serious comorbidity and body mass index (BMI) of 33.0 to 33.9 in adult, unspecified obesity type    Hot flushes, perimenopausal        Counseled on age and gender appropriate medical preventative services, including cancer screenings, immunizations, overall nutritional health, need for a consistent exercise regimen and an overall push towards maintaining a vigorous and active lifestyle.      Follow up in about 6 months (around 8/20/2025) for HTN.        This note was generated with the assistance of ambient listening technology. Verbal consent was obtained by the patient and accompanying visitor(s) for the recording of patient appointment to facilitate this note. I attest to having reviewed and edited the generated note for accuracy, though some syntax or spelling errors may persist. Please contact the author of this note for any clarification.                   [1]   Current Outpatient Medications on File Prior to Visit   Medication Sig Dispense Refill    baclofen (LIORESAL) 10 MG tablet Take 10 mg by mouth once  daily at 6am.      cyanocobalamin, vitamin B-12, (VITAMIN B-12 ORAL) Take 1 tablet by mouth once daily.      meloxicam (MOBIC) 15 MG tablet Take 1 tablet (15 mg total) by mouth once daily. 90 tablet 1    multivitamin (THERAGRAN) per tablet Take 1 tablet by mouth once daily.       No current facility-administered medications on file prior to visit.   [2]   Social History  Tobacco Use    Smoking status: Former    Smokeless tobacco: Never   Substance Use Topics    Alcohol use: Yes     Comment: rare    Drug use: No

## 2025-03-10 ENCOUNTER — PATIENT MESSAGE (OUTPATIENT)
Dept: ADMINISTRATIVE | Facility: HOSPITAL | Age: 55
End: 2025-03-10
Payer: COMMERCIAL

## 2025-03-18 ENCOUNTER — PATIENT MESSAGE (OUTPATIENT)
Dept: ADMINISTRATIVE | Facility: HOSPITAL | Age: 55
End: 2025-03-18
Payer: COMMERCIAL

## 2025-04-04 ENCOUNTER — PATIENT MESSAGE (OUTPATIENT)
Dept: FAMILY MEDICINE | Facility: CLINIC | Age: 55
End: 2025-04-04
Payer: COMMERCIAL

## 2025-04-04 DIAGNOSIS — M54.16 LUMBAR RADICULOPATHY: Primary | ICD-10-CM

## 2025-04-04 DIAGNOSIS — M48.062 SPINAL STENOSIS OF LUMBAR REGION WITH NEUROGENIC CLAUDICATION: ICD-10-CM

## 2025-04-04 DIAGNOSIS — M54.16 LUMBAR RADICULOPATHY, CHRONIC: ICD-10-CM

## 2025-04-12 ENCOUNTER — HOSPITAL ENCOUNTER (OUTPATIENT)
Dept: RADIOLOGY | Facility: HOSPITAL | Age: 55
Discharge: HOME | End: 2025-04-12
Attending: FAMILY MEDICINE
Payer: COMMERCIAL

## 2025-04-12 DIAGNOSIS — M54.16 LUMBAR RADICULOPATHY, CHRONIC: ICD-10-CM

## 2025-04-12 PROCEDURE — 72158 MRI LUMBAR SPINE W/O & W/DYE: CPT | Mod: 26,,, | Performed by: RADIOLOGY

## 2025-04-12 PROCEDURE — 72158 MRI LUMBAR SPINE W/O & W/DYE: CPT | Mod: TC

## 2025-04-12 PROCEDURE — 25500020 PHARM REV CODE 255: Performed by: FAMILY MEDICINE

## 2025-04-12 PROCEDURE — A9585 GADOBUTROL INJECTION: HCPCS | Performed by: FAMILY MEDICINE

## 2025-04-12 RX ORDER — GADOBUTROL 604.72 MG/ML
10 INJECTION INTRAVENOUS
Status: COMPLETED | OUTPATIENT
Start: 2025-04-12 | End: 2025-04-12

## 2025-04-12 RX ADMIN — GADOBUTROL 10 ML: 604.72 INJECTION INTRAVENOUS at 10:04

## 2025-07-24 ENCOUNTER — TELEPHONE (OUTPATIENT)
Dept: FAMILY MEDICINE | Facility: CLINIC | Age: 55
End: 2025-07-24
Payer: COMMERCIAL

## 2025-07-24 VITALS — DIASTOLIC BLOOD PRESSURE: 84 MMHG | SYSTOLIC BLOOD PRESSURE: 125 MMHG

## (undated) DEVICE — TROCAR ENDOPATH XCEL 5MM 7.5CM

## (undated) DEVICE — KIT SURGIFLO HEMOSTATIC MATRIX

## (undated) DEVICE — SOL 9P NACL IRR PIC IL

## (undated) DEVICE — PACK LAPAROSCOPY BAPTIST

## (undated) DEVICE — PAD ABD 8X10 STERILE

## (undated) DEVICE — TROCAR ENDOPATH XCEL 5MM 15CM

## (undated) DEVICE — SOL NS 1000CC

## (undated) DEVICE — TIP RUMI KOH-EFFICIENT 3.0

## (undated) DEVICE — CLOSURE SKIN STERI STRIP 1/2X4

## (undated) DEVICE — TOWEL OR NONABSORB ADH 17X26

## (undated) DEVICE — SUT 0 VICRYL PLUS CT-1 27IN

## (undated) DEVICE — SEE MEDLINE ITEM 152622

## (undated) DEVICE — SET CYSTO IRRIGATION UNIV SPIK

## (undated) DEVICE — SYR 50ML CATH TIP

## (undated) DEVICE — ELECTRODE REM PLYHSV RETURN 9

## (undated) DEVICE — JELLY KY LUBRICATING 5G PACKET

## (undated) DEVICE — APPLICATOR ENDOSCOPIC

## (undated) DEVICE — IRRIGATOR ENDOSCOPY DISP.

## (undated) DEVICE — SYR 50CC LL

## (undated) DEVICE — GLOVE BIOGEL SKINSENSE PI 6.0

## (undated) DEVICE — BANDAGE ADHESIVE

## (undated) DEVICE — SYR 10CC LUER LOCK

## (undated) DEVICE — SCISSOR 5MMX35CM DIRECT DRIVE

## (undated) DEVICE — SET TRI-LUMEN FILTERED TUBE

## (undated) DEVICE — SEE MEDLINE ITEM 156930

## (undated) DEVICE — TROCAR ENDOPATH XCEL 11MM 10CM

## (undated) DEVICE — NDL INSUF ULTRA VERESS 120MM

## (undated) DEVICE — TIP RUMI GREEN DISPOSABLE6.7MM

## (undated) DEVICE — ENDOSTITCH POLYSORB 0 ES-9

## (undated) DEVICE — UNDERGLOVE BIOGEL PI SZ 6.5 LF

## (undated) DEVICE — PAD PERI POST REPLACEMNT

## (undated) DEVICE — SPONGE LAP 18X18 PREWASHED

## (undated) DEVICE — Device

## (undated) DEVICE — KIT WING PAD POSITIONING

## (undated) DEVICE — SOL ELECTROLUBE ANTI-STIC

## (undated) DEVICE — SUT MCRYL PLUS 4-0 PS2 27IN

## (undated) DEVICE — SOL STRL WATER INJ 1000ML BG

## (undated) DEVICE — FORCEP 5MM BIPOLAR CUT EVEREST